# Patient Record
Sex: MALE | Race: OTHER | HISPANIC OR LATINO | URBAN - METROPOLITAN AREA
[De-identification: names, ages, dates, MRNs, and addresses within clinical notes are randomized per-mention and may not be internally consistent; named-entity substitution may affect disease eponyms.]

---

## 2017-03-20 ENCOUNTER — OUTPATIENT (OUTPATIENT)
Dept: OUTPATIENT SERVICES | Facility: HOSPITAL | Age: 27
LOS: 1 days | Discharge: HOME | End: 2017-03-20

## 2017-03-20 DIAGNOSIS — T84.84XA PAIN DUE TO INTERNAL ORTHOPEDIC PROSTHETIC DEVICES, IMPLANTS AND GRAFTS, INITIAL ENCOUNTER: ICD-10-CM

## 2017-03-20 DIAGNOSIS — Z96.7 PRESENCE OF OTHER BONE AND TENDON IMPLANTS: Chronic | ICD-10-CM

## 2017-03-20 DIAGNOSIS — Z98.890 OTHER SPECIFIED POSTPROCEDURAL STATES: Chronic | ICD-10-CM

## 2017-03-20 LAB
APPEARANCE UR: CLEAR — SIGNIFICANT CHANGE UP
BASOPHILS # BLD AUTO: 0.1 K/UL — SIGNIFICANT CHANGE UP (ref 0–0.2)
BASOPHILS NFR BLD AUTO: 0.8 % — SIGNIFICANT CHANGE UP (ref 0–2)
BILIRUB UR-MCNC: NEGATIVE — SIGNIFICANT CHANGE UP
COLOR SPEC: YELLOW — SIGNIFICANT CHANGE UP
DIFF PNL FLD: NEGATIVE — SIGNIFICANT CHANGE UP
EOSINOPHIL # BLD AUTO: 0.1 K/UL — SIGNIFICANT CHANGE UP (ref 0–0.5)
EOSINOPHIL NFR BLD AUTO: 1.8 % — SIGNIFICANT CHANGE UP (ref 0–6)
GLUCOSE UR QL: NEGATIVE MG/DL — SIGNIFICANT CHANGE UP
HCT VFR BLD CALC: 43 % — SIGNIFICANT CHANGE UP (ref 39–50)
HGB BLD-MCNC: 15.2 G/DL — SIGNIFICANT CHANGE UP (ref 13–17)
KETONES UR-MCNC: NEGATIVE — SIGNIFICANT CHANGE UP
LEUKOCYTE ESTERASE UR-ACNC: NEGATIVE — SIGNIFICANT CHANGE UP
LYMPHOCYTES # BLD AUTO: 2.5 K/UL — SIGNIFICANT CHANGE UP (ref 1–3.3)
LYMPHOCYTES # BLD AUTO: 34.7 % — SIGNIFICANT CHANGE UP (ref 13–44)
MCHC RBC-ENTMCNC: 35.2 GM/DL — SIGNIFICANT CHANGE UP (ref 32–36)
MCHC RBC-ENTMCNC: 36.3 PG — HIGH (ref 27–34)
MCV RBC AUTO: 103.2 FL — HIGH (ref 80–100)
MONOCYTES # BLD AUTO: 0.5 K/UL — SIGNIFICANT CHANGE UP (ref 0–0.9)
MONOCYTES NFR BLD AUTO: 7.4 % — SIGNIFICANT CHANGE UP (ref 2–14)
NEUTROPHILS # BLD AUTO: 3.9 K/UL — SIGNIFICANT CHANGE UP (ref 1.8–7.4)
NEUTROPHILS NFR BLD AUTO: 55.3 % — SIGNIFICANT CHANGE UP (ref 43–77)
NITRITE UR-MCNC: NEGATIVE — SIGNIFICANT CHANGE UP
PH UR: 5 — SIGNIFICANT CHANGE UP (ref 4.8–8)
PLATELET # BLD AUTO: 320 K/UL — SIGNIFICANT CHANGE UP (ref 150–400)
PROT UR-MCNC: NEGATIVE MG/DL — SIGNIFICANT CHANGE UP
RBC # BLD: 4.17 M/UL — LOW (ref 4.2–5.8)
RBC # FLD: 13.7 % — SIGNIFICANT CHANGE UP (ref 10.3–14.5)
SP GR SPEC: 1.02 — SIGNIFICANT CHANGE UP (ref 1.01–1.02)
UROBILINOGEN FLD QL: NEGATIVE MG/DL — SIGNIFICANT CHANGE UP
WBC # BLD: 7.1 K/UL — SIGNIFICANT CHANGE UP (ref 3.8–10.5)
WBC # FLD AUTO: 7.1 K/UL — SIGNIFICANT CHANGE UP (ref 3.8–10.5)

## 2017-03-20 NOTE — CHART NOTE - NSCHARTNOTEFT_GEN_A_CORE
Plan  1. Stop all NSAIDS, herbal supplements and vitamins for 7 days.  2. NPO at midnight.  3. Use EZ sponges as directed

## 2017-03-20 NOTE — ASU PATIENT PROFILE, ADULT - PSH
H/O right knee surgery  2008 for patella injury  S/P ORIF (open reduction internal fixation) fracture  2011 right patella fracture

## 2017-03-23 RX ORDER — ACETAMINOPHEN 500 MG
975 TABLET ORAL ONCE
Qty: 0 | Refills: 0 | Status: COMPLETED | OUTPATIENT
Start: 2017-03-24 | End: 2017-03-24

## 2017-03-23 RX ORDER — FAMOTIDINE 10 MG/ML
20 INJECTION INTRAVENOUS ONCE
Qty: 0 | Refills: 0 | Status: COMPLETED | OUTPATIENT
Start: 2017-03-24 | End: 2017-03-24

## 2017-03-23 RX ORDER — CELECOXIB 200 MG/1
200 CAPSULE ORAL ONCE
Qty: 0 | Refills: 0 | Status: COMPLETED | OUTPATIENT
Start: 2017-03-24 | End: 2017-03-24

## 2017-03-23 RX ORDER — OXYCODONE HYDROCHLORIDE 5 MG/1
10 TABLET ORAL ONCE
Qty: 0 | Refills: 0 | Status: DISCONTINUED | OUTPATIENT
Start: 2017-03-24 | End: 2017-03-24

## 2017-03-23 RX ORDER — SODIUM CHLORIDE 9 MG/ML
3 INJECTION INTRAMUSCULAR; INTRAVENOUS; SUBCUTANEOUS EVERY 8 HOURS
Qty: 0 | Refills: 0 | Status: DISCONTINUED | OUTPATIENT
Start: 2017-03-24 | End: 2017-04-08

## 2017-03-24 ENCOUNTER — OUTPATIENT (OUTPATIENT)
Dept: OUTPATIENT SERVICES | Facility: HOSPITAL | Age: 27
LOS: 1 days | Discharge: HOME | End: 2017-03-24
Payer: OTHER MISCELLANEOUS

## 2017-03-24 VITALS
HEART RATE: 84 BPM | TEMPERATURE: 98 F | DIASTOLIC BLOOD PRESSURE: 77 MMHG | OXYGEN SATURATION: 100 % | SYSTOLIC BLOOD PRESSURE: 122 MMHG | RESPIRATION RATE: 18 BRPM

## 2017-03-24 VITALS
WEIGHT: 145.95 LBS | TEMPERATURE: 98 F | SYSTOLIC BLOOD PRESSURE: 131 MMHG | HEART RATE: 91 BPM | RESPIRATION RATE: 16 BRPM | OXYGEN SATURATION: 100 % | HEIGHT: 61 IN | DIASTOLIC BLOOD PRESSURE: 67 MMHG

## 2017-03-24 DIAGNOSIS — Z98.890 OTHER SPECIFIED POSTPROCEDURAL STATES: Chronic | ICD-10-CM

## 2017-03-24 DIAGNOSIS — Z96.7 PRESENCE OF OTHER BONE AND TENDON IMPLANTS: Chronic | ICD-10-CM

## 2017-03-24 PROCEDURE — 73564 X-RAY EXAM KNEE 4 OR MORE: CPT | Mod: 26,RT

## 2017-03-24 RX ORDER — OXYCODONE HYDROCHLORIDE 5 MG/1
5 TABLET ORAL EVERY 4 HOURS
Qty: 0 | Refills: 0 | Status: DISCONTINUED | OUTPATIENT
Start: 2017-03-24 | End: 2017-03-24

## 2017-03-24 RX ORDER — FENTANYL CITRATE 50 UG/ML
50 INJECTION INTRAVENOUS
Qty: 0 | Refills: 0 | Status: DISCONTINUED | OUTPATIENT
Start: 2017-03-24 | End: 2017-03-24

## 2017-03-24 RX ORDER — ONDANSETRON 8 MG/1
4 TABLET, FILM COATED ORAL ONCE
Qty: 0 | Refills: 0 | Status: DISCONTINUED | OUTPATIENT
Start: 2017-03-24 | End: 2017-03-24

## 2017-03-24 RX ORDER — SODIUM CHLORIDE 9 MG/ML
1000 INJECTION INTRAMUSCULAR; INTRAVENOUS; SUBCUTANEOUS
Qty: 0 | Refills: 0 | Status: DISCONTINUED | OUTPATIENT
Start: 2017-03-24 | End: 2017-03-24

## 2017-03-24 RX ORDER — SODIUM CHLORIDE 9 MG/ML
1000 INJECTION INTRAMUSCULAR; INTRAVENOUS; SUBCUTANEOUS
Qty: 0 | Refills: 0 | Status: DISCONTINUED | OUTPATIENT
Start: 2017-03-24 | End: 2017-04-08

## 2017-03-24 RX ADMIN — FENTANYL CITRATE 50 MICROGRAM(S): 50 INJECTION INTRAVENOUS at 11:29

## 2017-03-24 RX ADMIN — SODIUM CHLORIDE 75 MILLILITER(S): 9 INJECTION INTRAMUSCULAR; INTRAVENOUS; SUBCUTANEOUS at 11:21

## 2017-03-24 RX ADMIN — CELECOXIB 200 MILLIGRAM(S): 200 CAPSULE ORAL at 09:06

## 2017-03-24 RX ADMIN — Medication 975 MILLIGRAM(S): at 09:06

## 2017-03-24 RX ADMIN — FAMOTIDINE 20 MILLIGRAM(S): 10 INJECTION INTRAVENOUS at 09:06

## 2017-03-24 RX ADMIN — OXYCODONE HYDROCHLORIDE 10 MILLIGRAM(S): 5 TABLET ORAL at 09:06

## 2017-03-24 RX ADMIN — OXYCODONE HYDROCHLORIDE 5 MILLIGRAM(S): 5 TABLET ORAL at 11:32

## 2017-03-24 NOTE — ASU DISCHARGE PLAN (ADULT/PEDIATRIC). - NOTIFY
Bleeding that does not stop/Fever greater than 101/Numbness, color, or temperature change to extremity/Swelling that continues
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
## 2017-03-29 DIAGNOSIS — T84.84XA PAIN DUE TO INTERNAL ORTHOPEDIC PROSTHETIC DEVICES, IMPLANTS AND GRAFTS, INITIAL ENCOUNTER: ICD-10-CM

## 2017-06-25 ENCOUNTER — EMERGENCY (EMERGENCY)
Facility: HOSPITAL | Age: 27
LOS: 0 days | Discharge: HOME | End: 2017-06-25
Attending: EMERGENCY MEDICINE | Admitting: EMERGENCY MEDICINE
Payer: COMMERCIAL

## 2017-06-25 VITALS
SYSTOLIC BLOOD PRESSURE: 120 MMHG | OXYGEN SATURATION: 100 % | HEART RATE: 74 BPM | DIASTOLIC BLOOD PRESSURE: 86 MMHG | TEMPERATURE: 97 F | HEIGHT: 65 IN | RESPIRATION RATE: 18 BRPM | WEIGHT: 139.99 LBS

## 2017-06-25 DIAGNOSIS — Z96.7 PRESENCE OF OTHER BONE AND TENDON IMPLANTS: Chronic | ICD-10-CM

## 2017-06-25 DIAGNOSIS — S82.091A OTHER FRACTURE OF RIGHT PATELLA, INITIAL ENCOUNTER FOR CLOSED FRACTURE: ICD-10-CM

## 2017-06-25 DIAGNOSIS — Z98.890 OTHER SPECIFIED POSTPROCEDURAL STATES: Chronic | ICD-10-CM
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
## 2017-06-25 PROCEDURE — 73562 X-RAY EXAM OF KNEE 3: CPT | Mod: 26,RT

## 2017-06-25 PROCEDURE — 99283 EMERGENCY DEPT VISIT LOW MDM: CPT

## 2017-06-25 RX ORDER — OXYCODONE AND ACETAMINOPHEN 5; 325 MG/1; MG/1
1 TABLET ORAL
Qty: 12 | Refills: 0
Start: 2017-06-25 | End: 2017-06-28

## 2017-06-25 NOTE — ED STATDOCS - ATTENDING CONTRIBUTION TO CARE
I, Shaq Mcneil,  performed the initial face to face bedside interview with this patient regarding history of present illness, review of symptoms and relevant past medical, social and family history.  I completed an independent physical examination.  I was the initial provider who evaluated this patient. I have signed out the follow up of any pending tests (i.e. labs, radiological studies) to the ACP.  I have communicated the patient’s plan of care and disposition with the ACP.  The history, relevant review of systems, past medical and surgical history, medical decision making, and physical examination was documented by the scribe in my presence and I attest to the accuracy of the documentation.

## 2017-06-25 NOTE — ED STATDOCS - CARE PLAN
Principal Discharge DX:	Closed displaced fracture of right patella with malunion, unspecified fracture morphology, subsequent encounter

## 2017-06-25 NOTE — ED ADULT NURSE NOTE - CHIEF COMPLAINT QUOTE
pt [facility] no blood thinners did not hit head. no LOC. hurt right knee. able to ambulate with assist.

## 2017-06-25 NOTE — ED STATDOCS - PROGRESS NOTE DETAILS
27 yo male with a PMH of patellar fx with surgery in 2011 and removal of harware 3 months ago presents with right knee pain s/p fall. Pt able to walk on it but pain with flexion of the knee. Possible deformity noted with palpation of the patella. -Andrea Slaughter PA-C Spoke with ortho. Will come down to see him. -Andrea Slaughter PA-C Ortho called back and said that they wouldn't be able to come down, to place a knee immobilizer on the knee and weight bearing with crutches. -Pastor Slaughter PA-C

## 2017-06-25 NOTE — ED STATDOCS - PRINCIPAL DIAGNOSIS
Closed displaced fracture of right patella with malunion, unspecified fracture morphology, subsequent encounter

## 2017-06-25 NOTE — ED ADULT TRIAGE NOTE - CHIEF COMPLAINT QUOTE
pt [facility] no blood thinners did not hit head. no LOC. hurt right knee. able to ambulate with assist.

## 2017-06-25 NOTE — ED STATDOCS - OBJECTIVE STATEMENT
27 y/o M presents to the ED c/o right knee pain. Pt slipped and fell today at home. He states he has had 3 surgeries on his right knee, current orthopedist is Dr. Ahuja. Currently pt has no other complaints.

## 2017-10-17 ENCOUNTER — TRANSCRIPTION ENCOUNTER (OUTPATIENT)
Age: 27
End: 2017-10-17

## 2017-10-18 ENCOUNTER — APPOINTMENT (OUTPATIENT)
Dept: ORTHOPEDIC SURGERY | Facility: CLINIC | Age: 27
End: 2017-10-18
Payer: COMMERCIAL

## 2017-10-18 VITALS
SYSTOLIC BLOOD PRESSURE: 140 MMHG | HEIGHT: 61 IN | HEART RATE: 122 BPM | WEIGHT: 143 LBS | BODY MASS INDEX: 27 KG/M2 | DIASTOLIC BLOOD PRESSURE: 97 MMHG

## 2017-10-18 PROBLEM — Z00.00 ENCOUNTER FOR PREVENTIVE HEALTH EXAMINATION: Status: ACTIVE | Noted: 2017-10-18

## 2017-10-18 PROCEDURE — 99204 OFFICE O/P NEW MOD 45 MIN: CPT

## 2017-11-01 ENCOUNTER — TRANSCRIPTION ENCOUNTER (OUTPATIENT)
Age: 27
End: 2017-11-01

## 2017-12-18 ENCOUNTER — APPOINTMENT (OUTPATIENT)
Dept: ORTHOPEDIC SURGERY | Facility: CLINIC | Age: 27
End: 2017-12-18
Payer: COMMERCIAL

## 2017-12-18 VITALS
HEART RATE: 102 BPM | SYSTOLIC BLOOD PRESSURE: 150 MMHG | BODY MASS INDEX: 27 KG/M2 | WEIGHT: 143 LBS | HEIGHT: 61 IN | DIASTOLIC BLOOD PRESSURE: 96 MMHG

## 2017-12-18 DIAGNOSIS — Z78.9 OTHER SPECIFIED HEALTH STATUS: ICD-10-CM

## 2017-12-18 PROCEDURE — 73560 X-RAY EXAM OF KNEE 1 OR 2: CPT | Mod: RT

## 2017-12-18 PROCEDURE — 99214 OFFICE O/P EST MOD 30 MIN: CPT

## 2017-12-18 RX ORDER — CHROMIUM 200 MCG
TABLET ORAL
Refills: 0 | Status: ACTIVE | COMMUNITY

## 2018-03-27 ENCOUNTER — APPOINTMENT (OUTPATIENT)
Dept: ORTHOPEDIC SURGERY | Facility: CLINIC | Age: 28
End: 2018-03-27

## 2018-04-17 ENCOUNTER — APPOINTMENT (OUTPATIENT)
Dept: ORTHOPEDIC SURGERY | Facility: CLINIC | Age: 28
End: 2018-04-17
Payer: COMMERCIAL

## 2018-04-17 VITALS
WEIGHT: 143 LBS | DIASTOLIC BLOOD PRESSURE: 87 MMHG | HEIGHT: 61 IN | HEART RATE: 100 BPM | SYSTOLIC BLOOD PRESSURE: 151 MMHG | BODY MASS INDEX: 27 KG/M2

## 2018-04-17 VITALS
DIASTOLIC BLOOD PRESSURE: 97 MMHG | SYSTOLIC BLOOD PRESSURE: 139 MMHG | HEIGHT: 61 IN | BODY MASS INDEX: 27 KG/M2 | WEIGHT: 143 LBS | HEART RATE: 101 BPM

## 2018-04-17 PROCEDURE — 99214 OFFICE O/P EST MOD 30 MIN: CPT

## 2018-04-17 PROCEDURE — 73560 X-RAY EXAM OF KNEE 1 OR 2: CPT | Mod: RT

## 2019-08-30 PROBLEM — F41.9 ANXIETY DISORDER, UNSPECIFIED: Chronic | Status: ACTIVE | Noted: 2017-03-20

## 2019-08-30 PROBLEM — S82.009A UNSPECIFIED FRACTURE OF UNSPECIFIED PATELLA, INITIAL ENCOUNTER FOR CLOSED FRACTURE: Chronic | Status: ACTIVE | Noted: 2017-03-20

## 2019-08-30 PROBLEM — M25.561 PAIN IN RIGHT KNEE: Chronic | Status: ACTIVE | Noted: 2017-03-20

## 2019-09-14 ENCOUNTER — TRANSCRIPTION ENCOUNTER (OUTPATIENT)
Age: 29
End: 2019-09-14

## 2019-09-17 ENCOUNTER — APPOINTMENT (OUTPATIENT)
Dept: ORTHOPEDIC SURGERY | Facility: CLINIC | Age: 29
End: 2019-09-17
Payer: COMMERCIAL

## 2019-09-17 VITALS
HEART RATE: 96 BPM | BODY MASS INDEX: 27.38 KG/M2 | TEMPERATURE: 99.1 F | HEIGHT: 61 IN | WEIGHT: 145 LBS | SYSTOLIC BLOOD PRESSURE: 156 MMHG | DIASTOLIC BLOOD PRESSURE: 103 MMHG

## 2019-09-17 PROCEDURE — 73560 X-RAY EXAM OF KNEE 1 OR 2: CPT | Mod: RT

## 2019-09-17 PROCEDURE — 99214 OFFICE O/P EST MOD 30 MIN: CPT

## 2019-09-17 NOTE — PHYSICAL EXAM
[de-identified] :  Films from today of the right knee shows a transverse patella fracture stabilized by tension band fixation. There is evidence of fracture healing with likely increased callus at the fracture site. \par  [de-identified] : General: Well appearing, no acute distress, A&O\par Neurologic: A&Ox3, No focal deficits\par Head: NCAT without abrasions, lacerations, or ecchymosis to head, face, or scalp\par Eyes: No scleral icterus, no gross abnormalities\par Respiratory: Equal chest wall expansion bilaterally, no accessory muscle use\par Lymphatic: No lymphadenopathy palpated\par Skin: Warm and dry\par Psychiatric: Normal mood and affect\par \par right knee exam\par \par Skin: well healed anterior surgical scars\par Inspection: No obvious malalignment, no masses, no swelling, no effusion\par Pulses: 2+ DP/PT pulses\par ROM: RIGHT 0-120 degrees of flexion. No pain with deep knee flexion. LEFT 0-140 degrees of flexion. No pain with deep knee flexion. \par Tenderness: No MJLT. No LJLT. No pain over the patella facets. No pain to the quadriceps mechanism. +TTP over wire\par Stability: Stable to varus, valgus, lachman testing. Negative anterior/posterior drawer.\par Strength: 5/5 Q/H/TA/GS/EHL, no atrophy\par Neuro: In tact to light touch throughout,\par Additional tests: Negative McMurrays test, + patellar grind test. \par \par

## 2019-09-17 NOTE — DISCUSSION/SUMMARY
[de-identified] : 28-year-old male with right patellar delayed union. We had a prolonged discussion today about the options available to him currently. These include continued observation with conservative management or revision surgery. We discussed that there is no guarantee that the patella will heal given the multitude of surgery he has had performed. Currently his knee is functional and has a great range of motion. I think there has been healing since the last visit. We discussed that there is a significant risk of making the knee worse rather than better with additional surgery. I would recommend topical anti-inflammatories for his pain but if it does not relieve the pain, we can discuss implant removal with either myself or Dr. Alexander. He may return to all activities without restriction & follow up PRN. The patient was given the opportunity to ask questions and all questions were answered to their satisfaction.\par \par Filiberto Obrien MD\par Orthopaedic Trauma Surgeon\par [facility]\par [facility] [place]

## 2019-09-17 NOTE — HISTORY OF PRESENT ILLNESS
[3] : a current pain level of 3/10 [de-identified] : 27 yo M presents for follow up on R open patella fx s/p ORIF 6 surgeries due to nonhealing patella fx. He reports overall he is doing well. He has been exercising the knee with PT without difficulties. He denies any pain with ambulation or with knee ROM. He has an area on the lateral knee that is TTP. He had been following with endocrinologist and was found to be Vit D deficient & has been taking Vitamin D. Modifying factors - not exacerbated by bending, not exacerbated by lifting and not exacerbated by weight bearing.  [Bending] : worsened by bending [Lifting] : worsened by lifting [Recumbency] : relieved by recumbency [Rest] : relieved by rest

## 2019-10-17 ENCOUNTER — OTHER (OUTPATIENT)
Age: 29
End: 2019-10-17

## 2019-10-17 RX ORDER — DICLOFENAC SODIUM 10 MG/G
1 GEL TOPICAL DAILY
Qty: 1 | Refills: 3 | Status: DISCONTINUED | COMMUNITY
Start: 2019-09-17 | End: 2019-10-17

## 2020-05-19 ENCOUNTER — APPOINTMENT (OUTPATIENT)
Dept: ORTHOPEDIC SURGERY | Facility: CLINIC | Age: 30
End: 2020-05-19

## 2020-07-06 ENCOUNTER — APPOINTMENT (OUTPATIENT)
Dept: ORTHOPEDIC SURGERY | Facility: CLINIC | Age: 30
End: 2020-07-06
Payer: COMMERCIAL

## 2020-07-06 VITALS
SYSTOLIC BLOOD PRESSURE: 142 MMHG | BODY MASS INDEX: 27.38 KG/M2 | HEIGHT: 61 IN | DIASTOLIC BLOOD PRESSURE: 94 MMHG | WEIGHT: 145 LBS | HEART RATE: 99 BPM

## 2020-07-06 VITALS — TEMPERATURE: 97.9 F

## 2020-07-06 PROCEDURE — 73560 X-RAY EXAM OF KNEE 1 OR 2: CPT | Mod: RT

## 2020-07-06 PROCEDURE — 99214 OFFICE O/P EST MOD 30 MIN: CPT

## 2020-07-06 NOTE — DISCUSSION/SUMMARY
[de-identified] : 29-year-old male with right patellar delayed union with hardware irritation.\par \par \par  Currently his knee is functional and has a great range of motion. I think there has been healing since the last visit. Patient now has new pain since December where hardware is prominent and causing underlying pain and irritation preventing him from doing things he would like to do physically. We discussed that there is a significant risk of making the knee worse rather than better with additional surgery. Patient tried to see Dr. Alexander to schedule hardware removal but with his new job and changed insurance Dr. Alexander is no longer in his network. He would like to have the hardware removed as he has tried antiinflammatories and activity modification and feels the prominent hardware is prohibiting his further progression. The patient was given the information to call my surgical scheduler in the next couple of days and work out a time that works for him for hardware removal. The patient was given the opportunity to ask questions and all questions were answered to their satisfaction.\par \par \par \par Filiberto Obrien MD\par Orthopaedic Trauma Surgeon\par [facility]\par [facility] [place]

## 2020-07-06 NOTE — HISTORY OF PRESENT ILLNESS
[de-identified] : 30 yo M presents for follow up on R open patella fx s/p ORIF 6 surgeries due to nonhealing patella fx. He reports overall he is doing well, but has had pain since december over the superolateral portion of his patella where he has hardware irritation and a small skin irritation. He denies any pain with ambulation or with knee ROM. He has an area on the lateral knee that is TTP.

## 2020-07-06 NOTE — PHYSICAL EXAM
[de-identified] : General: Well appearing, no acute distress, A&O\par Neurologic: A&Ox3, No focal deficits\par Head: NCAT without abrasions, lacerations, or ecchymosis to head, face, or scalp\par Eyes: No scleral icterus, no gross abnormalities\par Respiratory: Equal chest wall expansion bilaterally, no accessory muscle use\par Lymphatic: No lymphadenopathy palpated\par Skin: Warm and dry\par Psychiatric: Normal mood and affect\par \par right knee exam\par \par Skin: well healed anterior surgical scars, small anterolateral pustule where hardware irritation is from the tension band. No drainage. Very Tender to palpation.\par Inspection: No obvious malalignment, no masses, no swelling, no effusion\par Pulses: 2+ DP/PT pulses\par ROM: RIGHT 0-120 degrees of flexion. Mild quadriceps atrophy compared to contralateral side. No pain with deep knee flexion. LEFT 0-140 degrees of flexion. No pain with deep knee flexion. \par Tenderness: No MJLT. No LJLT. No pain over the patella facets. No pain to the quadriceps mechanism. +TTP over wire\par Stability: Stable to varus, valgus, lachman testing. Negative anterior/posterior drawer.\par Strength: 5/5 Q/H/TA/GS/EHL, no atrophy\par Neuro: In tact to light touch throughout,\par Additional tests: Negative McMurrays test, + patellar grind test. \par \par  [de-identified] :  Films from today of the right knee shows a transverse patella fracture stabilized by tension band fixation. There is evidence of healing with increased bridging callus at the fracture site. \par

## 2020-07-10 ENCOUNTER — OUTPATIENT (OUTPATIENT)
Dept: OUTPATIENT SERVICES | Facility: HOSPITAL | Age: 30
LOS: 1 days | End: 2020-07-10
Payer: COMMERCIAL

## 2020-07-10 DIAGNOSIS — Z98.890 OTHER SPECIFIED POSTPROCEDURAL STATES: Chronic | ICD-10-CM

## 2020-07-10 DIAGNOSIS — Z96.7 PRESENCE OF OTHER BONE AND TENDON IMPLANTS: Chronic | ICD-10-CM

## 2020-07-10 DIAGNOSIS — Z01.818 ENCOUNTER FOR OTHER PREPROCEDURAL EXAMINATION: ICD-10-CM

## 2020-07-10 LAB
ANION GAP SERPL CALC-SCNC: 13 MMOL/L — SIGNIFICANT CHANGE UP (ref 5–17)
APTT BLD: 35.1 SEC — SIGNIFICANT CHANGE UP (ref 27.5–35.5)
BASOPHILS # BLD AUTO: 0.04 K/UL — SIGNIFICANT CHANGE UP (ref 0–0.2)
BASOPHILS NFR BLD AUTO: 0.5 % — SIGNIFICANT CHANGE UP (ref 0–2)
BUN SERPL-MCNC: 13 MG/DL — SIGNIFICANT CHANGE UP (ref 8–20)
CALCIUM SERPL-MCNC: 9.2 MG/DL — SIGNIFICANT CHANGE UP (ref 8.6–10.2)
CHLORIDE SERPL-SCNC: 101 MMOL/L — SIGNIFICANT CHANGE UP (ref 98–107)
CO2 SERPL-SCNC: 25 MMOL/L — SIGNIFICANT CHANGE UP (ref 22–29)
CREAT SERPL-MCNC: 0.69 MG/DL — SIGNIFICANT CHANGE UP (ref 0.5–1.3)
EOSINOPHIL # BLD AUTO: 0.05 K/UL — SIGNIFICANT CHANGE UP (ref 0–0.5)
EOSINOPHIL NFR BLD AUTO: 0.6 % — SIGNIFICANT CHANGE UP (ref 0–6)
GLUCOSE SERPL-MCNC: 90 MG/DL — SIGNIFICANT CHANGE UP (ref 70–99)
HCT VFR BLD CALC: 43.2 % — SIGNIFICANT CHANGE UP (ref 39–50)
HGB BLD-MCNC: 15.3 G/DL — SIGNIFICANT CHANGE UP (ref 13–17)
IMM GRANULOCYTES NFR BLD AUTO: 0.2 % — SIGNIFICANT CHANGE UP (ref 0–1.5)
INR BLD: 1.1 RATIO — SIGNIFICANT CHANGE UP (ref 0.88–1.16)
LYMPHOCYTES # BLD AUTO: 3.33 K/UL — HIGH (ref 1–3.3)
LYMPHOCYTES # BLD AUTO: 39.1 % — SIGNIFICANT CHANGE UP (ref 13–44)
MCHC RBC-ENTMCNC: 35.4 GM/DL — SIGNIFICANT CHANGE UP (ref 32–36)
MCHC RBC-ENTMCNC: 36.3 PG — HIGH (ref 27–34)
MCV RBC AUTO: 102.4 FL — HIGH (ref 80–100)
MONOCYTES # BLD AUTO: 0.48 K/UL — SIGNIFICANT CHANGE UP (ref 0–0.9)
MONOCYTES NFR BLD AUTO: 5.6 % — SIGNIFICANT CHANGE UP (ref 2–14)
NEUTROPHILS # BLD AUTO: 4.6 K/UL — SIGNIFICANT CHANGE UP (ref 1.8–7.4)
NEUTROPHILS NFR BLD AUTO: 54 % — SIGNIFICANT CHANGE UP (ref 43–77)
PLATELET # BLD AUTO: 306 K/UL — SIGNIFICANT CHANGE UP (ref 150–400)
POTASSIUM SERPL-MCNC: 4 MMOL/L — SIGNIFICANT CHANGE UP (ref 3.5–5.3)
POTASSIUM SERPL-SCNC: 4 MMOL/L — SIGNIFICANT CHANGE UP (ref 3.5–5.3)
PROTHROM AB SERPL-ACNC: 12.7 SEC — SIGNIFICANT CHANGE UP (ref 10.6–13.6)
RBC # BLD: 4.22 M/UL — SIGNIFICANT CHANGE UP (ref 4.2–5.8)
RBC # FLD: 13.7 % — SIGNIFICANT CHANGE UP (ref 10.3–14.5)
SODIUM SERPL-SCNC: 139 MMOL/L — SIGNIFICANT CHANGE UP (ref 135–145)
WBC # BLD: 8.52 K/UL — SIGNIFICANT CHANGE UP (ref 3.8–10.5)
WBC # FLD AUTO: 8.52 K/UL — SIGNIFICANT CHANGE UP (ref 3.8–10.5)

## 2020-07-10 PROCEDURE — 85610 PROTHROMBIN TIME: CPT

## 2020-07-10 PROCEDURE — 36415 COLL VENOUS BLD VENIPUNCTURE: CPT

## 2020-07-10 PROCEDURE — 85730 THROMBOPLASTIN TIME PARTIAL: CPT

## 2020-07-10 PROCEDURE — 85027 COMPLETE CBC AUTOMATED: CPT

## 2020-07-10 PROCEDURE — 80048 BASIC METABOLIC PNL TOTAL CA: CPT

## 2020-07-10 PROCEDURE — G0463: CPT

## 2020-07-11 ENCOUNTER — APPOINTMENT (OUTPATIENT)
Dept: DISASTER EMERGENCY | Facility: CLINIC | Age: 30
End: 2020-07-11

## 2020-07-11 LAB — SARS-COV-2 N GENE NPH QL NAA+PROBE: NOT DETECTED

## 2020-07-16 ENCOUNTER — APPOINTMENT (OUTPATIENT)
Dept: ORTHOPEDIC SURGERY | Facility: AMBULATORY SURGERY CENTER | Age: 30
End: 2020-07-16
Payer: COMMERCIAL

## 2020-07-16 PROCEDURE — 27524 TREAT KNEECAP FRACTURE: CPT | Mod: RT

## 2020-07-22 ENCOUNTER — APPOINTMENT (OUTPATIENT)
Dept: ORTHOPEDIC SURGERY | Facility: CLINIC | Age: 30
End: 2020-07-22
Payer: COMMERCIAL

## 2020-07-22 PROCEDURE — 99024 POSTOP FOLLOW-UP VISIT: CPT

## 2020-07-22 PROCEDURE — 73560 X-RAY EXAM OF KNEE 1 OR 2: CPT | Mod: RT

## 2020-07-22 NOTE — HISTORY OF PRESENT ILLNESS
[Clean/Dry/Intact] : clean, dry and intact [Healed] : healed [Erythema] : not erythematous [Discharge] : absent of discharge [Swelling] : swollen [Neuro Intact] : an unremarkable neurological exam [Vascular Intact] : ~T peripheral vascular exam normal [Slow Progress] : is progressing slowly [No Sign of Infection] : is showing no signs of infection [Adequate Pain Control] : has adequate pain control [de-identified] : s/p repair of nonunion, right patella. Removal of implants, right patella. 7/6/2020 [de-identified] : The patient is a 29-year-old gentleman who presents today for postoperative follow-up s/p repair of nonunion, right patella. Removal of implants, right patella. 7/6/2020.  He is a history of multiple previous surgeries on the patella as well as patella nonunion.  He was doing well initially after surgery but states he is having difficulty extending the knee at the present time.  No pain at rest but positive pain with activity [de-identified] : Physical Exam:\par General: Well appearing, no acute distress, A&O\par Neurologic: A&Ox3, No focal deficits\par Head: NCAT without abrasions, lacerations, or ecchymosis to head, face, or scalp\par Respiratory: Equal chest wall expansion bilaterally, no accessory muscle use\par Lymphatic: No lymphadenopathy palpated\par Skin: Warm and dry\par Psychiatric: Normal mood and affect\par \par Straight leg raise limited secondary to pain.  Difficult to ascertain if limited quadriceps function is due to pain or patient participation. [de-identified] : Plain films of the right knee were obtained today.  They show no acute fracture, subluxation or dislocation. [de-identified] : There is difficult to fully tell if he is having weakness secondary to pain or if there could be some underlying pathology.  If he does not improve in the next week or 2, we will likely obtain an MRI of the knee to evaluate for any signs of patella tendon disruption or other extensor mechanism problem.  The patient was given the opportunity to ask questions and all questions were answered to their satisfaction.  \par \par Filiberto Obrien MD\par Orthopaedic Trauma Surgeon\par [facility] [place]\par Director Orthopaedic Trauma, [facility]\par \par \par \par

## 2021-04-15 DIAGNOSIS — Z01.818 ENCOUNTER FOR OTHER PREPROCEDURAL EXAMINATION: ICD-10-CM

## 2021-04-27 PROBLEM — Z87.81 HISTORY OF FRACTURE OF PATELLA: Status: RESOLVED | Noted: 2021-04-27 | Resolved: 2021-04-27

## 2021-04-27 PROBLEM — R79.89 ELEVATED LIVER FUNCTION TESTS: Status: RESOLVED | Noted: 2021-04-27 | Resolved: 2021-04-27

## 2021-05-05 ENCOUNTER — OUTPATIENT (OUTPATIENT)
Dept: OUTPATIENT SERVICES | Facility: HOSPITAL | Age: 31
LOS: 1 days | Discharge: HOME | End: 2021-05-05

## 2021-05-05 DIAGNOSIS — Z96.7 PRESENCE OF OTHER BONE AND TENDON IMPLANTS: Chronic | ICD-10-CM

## 2021-05-05 DIAGNOSIS — Z98.890 OTHER SPECIFIED POSTPROCEDURAL STATES: Chronic | ICD-10-CM

## 2021-05-05 DIAGNOSIS — D75.89 OTHER SPECIFIED DISEASES OF BLOOD AND BLOOD-FORMING ORGANS: ICD-10-CM

## 2021-05-06 ENCOUNTER — RESULT REVIEW (OUTPATIENT)
Age: 31
End: 2021-05-06

## 2021-05-06 ENCOUNTER — NON-APPOINTMENT (OUTPATIENT)
Age: 31
End: 2021-05-06

## 2021-05-06 ENCOUNTER — OUTPATIENT (OUTPATIENT)
Dept: OUTPATIENT SERVICES | Facility: HOSPITAL | Age: 31
LOS: 1 days | End: 2021-05-06
Payer: COMMERCIAL

## 2021-05-06 ENCOUNTER — APPOINTMENT (OUTPATIENT)
Dept: HEMATOLOGY ONCOLOGY | Facility: CLINIC | Age: 31
End: 2021-05-06
Payer: COMMERCIAL

## 2021-05-06 VITALS
HEART RATE: 83 BPM | WEIGHT: 144 LBS | BODY MASS INDEX: 27.21 KG/M2 | DIASTOLIC BLOOD PRESSURE: 82 MMHG | TEMPERATURE: 98.7 F | SYSTOLIC BLOOD PRESSURE: 145 MMHG

## 2021-05-06 DIAGNOSIS — Z86.39 PERSONAL HISTORY OF OTHER ENDOCRINE, NUTRITIONAL AND METABOLIC DISEASE: ICD-10-CM

## 2021-05-06 DIAGNOSIS — Z98.890 OTHER SPECIFIED POSTPROCEDURAL STATES: Chronic | ICD-10-CM

## 2021-05-06 DIAGNOSIS — R79.89 OTHER SPECIFIED ABNORMAL FINDINGS OF BLOOD CHEMISTRY: ICD-10-CM

## 2021-05-06 DIAGNOSIS — Z80.3 FAMILY HISTORY OF MALIGNANT NEOPLASM OF BREAST: ICD-10-CM

## 2021-05-06 DIAGNOSIS — Z96.7 PRESENCE OF OTHER BONE AND TENDON IMPLANTS: Chronic | ICD-10-CM

## 2021-05-06 DIAGNOSIS — Z87.81 PERSONAL HISTORY OF (HEALED) TRAUMATIC FRACTURE: ICD-10-CM

## 2021-05-06 LAB
BASOPHILS # BLD AUTO: 0.04 K/UL — SIGNIFICANT CHANGE UP (ref 0–0.2)
BASOPHILS NFR BLD AUTO: 0.6 % — SIGNIFICANT CHANGE UP (ref 0–2)
EOSINOPHIL # BLD AUTO: 0.09 K/UL — SIGNIFICANT CHANGE UP (ref 0–0.5)
EOSINOPHIL NFR BLD AUTO: 1.3 % — SIGNIFICANT CHANGE UP (ref 0–6)
FERRITIN SERPL-MCNC: 841 NG/ML — HIGH (ref 30–400)
HCT VFR BLD CALC: 38.8 % — LOW (ref 39–50)
HGB BLD-MCNC: 14.2 G/DL — SIGNIFICANT CHANGE UP (ref 13–17)
IMM GRANULOCYTES NFR BLD AUTO: 0.3 % — SIGNIFICANT CHANGE UP (ref 0–1.5)
IRON SATN MFR SERPL: 29 % — SIGNIFICANT CHANGE UP (ref 16–55)
IRON SATN MFR SERPL: 89 UG/DL — SIGNIFICANT CHANGE UP (ref 45–165)
LYMPHOCYTES # BLD AUTO: 3.34 K/UL — HIGH (ref 1–3.3)
LYMPHOCYTES # BLD AUTO: 49.4 % — HIGH (ref 13–44)
MCHC RBC-ENTMCNC: 36.6 GM/DL — HIGH (ref 32–36)
MCHC RBC-ENTMCNC: 37.2 PG — HIGH (ref 27–34)
MCV RBC AUTO: 101.6 FL — HIGH (ref 80–100)
MONOCYTES # BLD AUTO: 0.38 K/UL — SIGNIFICANT CHANGE UP (ref 0–0.9)
MONOCYTES NFR BLD AUTO: 5.6 % — SIGNIFICANT CHANGE UP (ref 2–14)
NEUTROPHILS # BLD AUTO: 2.89 K/UL — SIGNIFICANT CHANGE UP (ref 1.8–7.4)
NEUTROPHILS NFR BLD AUTO: 42.8 % — LOW (ref 43–77)
NRBC # BLD: 0 /100 WBCS — SIGNIFICANT CHANGE UP (ref 0–0)
PLATELET # BLD AUTO: 274 K/UL — SIGNIFICANT CHANGE UP (ref 150–400)
RBC # BLD: 3.82 M/UL — LOW (ref 4.2–5.8)
RBC # BLD: 4 M/UL — LOW (ref 4.2–5.8)
RBC # FLD: 13.9 % — SIGNIFICANT CHANGE UP (ref 10.3–14.5)
RETICS #: 138 K/UL — HIGH (ref 25–125)
RETICS/RBC NFR: 3.5 % — HIGH (ref 0.5–2.5)
TIBC SERPL-MCNC: 311 UG/DL — SIGNIFICANT CHANGE UP (ref 220–430)
UIBC SERPL-MCNC: 221 UG/DL — SIGNIFICANT CHANGE UP (ref 110–370)
WBC # BLD: 6.76 K/UL — SIGNIFICANT CHANGE UP (ref 3.8–10.5)
WBC # FLD AUTO: 6.76 K/UL — SIGNIFICANT CHANGE UP (ref 3.8–10.5)

## 2021-05-06 PROCEDURE — 81256 HFE GENE: CPT

## 2021-05-06 PROCEDURE — 99203 OFFICE O/P NEW LOW 30 MIN: CPT

## 2021-05-06 RX ORDER — DICLOFENAC SODIUM 10 MG/G
1 GEL TOPICAL DAILY
Qty: 1 | Refills: 0 | Status: DISCONTINUED | COMMUNITY
Start: 2019-10-17 | End: 2021-05-06

## 2021-05-06 RX ORDER — OXYCODONE 5 MG/1
5 TABLET ORAL EVERY 6 HOURS
Qty: 20 | Refills: 0 | Status: DISCONTINUED | COMMUNITY
Start: 2020-07-16 | End: 2021-05-06

## 2021-05-10 PROBLEM — Z80.3 FAMILY HISTORY OF MALIGNANT NEOPLASM OF BREAST: Status: ACTIVE | Noted: 2021-05-10

## 2021-05-10 RX ORDER — PSYLLIUM HUSK 0.4 G
CAPSULE ORAL
Refills: 0 | Status: ACTIVE | COMMUNITY

## 2021-05-10 RX ORDER — ESCITALOPRAM OXALATE 10 MG/1
10 TABLET ORAL
Refills: 0 | Status: ACTIVE | COMMUNITY

## 2021-05-10 NOTE — ASSESSMENT
[FreeTextEntry1] : Patient is a 30 y.o. with a chronic macrocytosis and also noted to have an elevated Ferritin level. \par 1. Macrocytosis - No apparent nutritional deficiencies, Euthyroid, and he is not a drinker.  Also cannot be attributed to any medication.   Will send off retic count  - can possibly be related to a compensated hemolysis. May also just be normal variant of no clinical concern. \par 2. Elevated Ferritin - Patient has had mild ALT elevations and Ferritin level >600 in 2019.  Will repeat today and also send HFE testing as well as zinc and copper levels.  May also be reactive give inflammation from chronic knee issues.  \par Patient was asked to return in ~ 1 month to review lab results. \par He was seen along with Dr. Goff.  She agrees with the above plan. \par \par \par Marcelle Wilkins\par Teresa Truong (Significant other)\par Dylan Mei (Father)

## 2021-05-10 NOTE — HISTORY OF PRESENT ILLNESS
[de-identified] : DONNA KAPLAN is a 30 y.o. with a PMH significant for multiple surgeries for a right knee patella fracture who is being referred to our office for macrocytosis and for a hx of an elevated Ferritin level. \par 4/3/21 - Routine labs for PCP visit - WBC: 6.18,  Hgb: 13.9   Hct:40.2   MCV: 106.1  Plts:  292  ESR: 2,  Ferritin: X    TSAT: 22%, B12: 511,  Folate: 8.8\par On review of old labs he had a Ferritin of 631 9/16/19, TSAT 23%, and a mildly elevated ALT of 59. Labs 1 month prior showed positive Hep A  Margareth (total, IgM neg), MCV at this time was 102.8. \par Patient reports after being told this he obtained his old medical records and trended his MCV over last 10 years.  He reports the lowest value was 98, highest was ~ 106.  \par He is not on, has not been on any long term meds.  He does not drink.  \par \par His major medical issue is multiple surgeries for a right knee patella fracture.  He has had at least 6 surgeries total starting 2004. \par Fractured 1st in 8th grade.  Apparently had ORIF, then had implants removed and had a refracture.  This was fixed but there was a concern for nonunion from another surgery and so needed repeat fixation. At one point there was delayed healing with a small gap requiring a bone growth stimulator.\par Then 6/25/17  he fell in the kitchen - poor healing.\par 7/6/20 - Repair of non-union and removal of implants - Filiberto Obrien.\par \par He does not recall ever being told that he had an elevated Ferritin level.  No family hx of this either. \par \par He reports chronic fatigue vs. lack of motivation.  He has trouble focusing and with his memory

## 2021-05-10 NOTE — REVIEW OF SYSTEMS
[Patient Intake Form Reviewed] : Patient intake form was reviewed [Negative] : Allergic/Immunologic [Fatigue] : fatigue

## 2021-05-11 LAB
COPPER SERPL-MCNC: 95 UG/DL — SIGNIFICANT CHANGE UP (ref 63–121)
ZINC SERPL-MCNC: 105 UG/DL — SIGNIFICANT CHANGE UP (ref 44–115)

## 2021-05-28 ENCOUNTER — APPOINTMENT (OUTPATIENT)
Dept: HEMATOLOGY ONCOLOGY | Facility: CLINIC | Age: 31
End: 2021-05-28
Payer: COMMERCIAL

## 2021-05-28 DIAGNOSIS — D75.89 OTHER SPECIFIED DISEASES OF BLOOD AND BLOOD-FORMING ORGANS: ICD-10-CM

## 2021-05-28 PROCEDURE — 99213 OFFICE O/P EST LOW 20 MIN: CPT | Mod: 95

## 2021-06-01 DIAGNOSIS — R79.89 OTHER SPECIFIED ABNORMAL FINDINGS OF BLOOD CHEMISTRY: ICD-10-CM

## 2021-06-03 NOTE — RESULTS/DATA
[FreeTextEntry1] : Hemochromatosis gene mutation : negative for C282Y ;  heterozygous for H63D mutation

## 2021-06-03 NOTE — ASSESSMENT
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 No hx of liver disease. \par R/o clinical hemochromatosis despite negative genetic testing.\par \par \par Will obtain liver MRI to evaluate hepatic iron content.\par \par If elevated- he will need therapeutic phlebotomies.\par \par D/w patient in details. Answered questions. He is concerned re risk of  liver damage due to excess iron.\par \par MIld chronic  macrocytosis- borderline . W/up negative. Not clinically significant. Monitor. \par \par Return visit after MRI.\par

## 2021-06-03 NOTE — HISTORY OF PRESENT ILLNESS
[Home] : at home, [unfilled] , at the time of the visit. [Medical Office: ([facility])___] : at the medical office located in  [Verbal consent obtained from patient] : the patient, [unfilled] [de-identified] : DONNA KAPLAN is a 30 y.o. with a PMH significant for multiple surgeries for a right knee patella fracture who is being referred to our office for macrocytosis and for a hx of an elevated Ferritin level. \par 4/3/21 - Routine labs for PCP visit - WBC: 6.18,  Hgb: 13.9   Hct:40.2   MCV: 106.1  Plts:  292  ESR: 2,     TSAT: 22%, B12: 511,  Folate: 8.8\par On review of old labs he had a Ferritin of 631 9/16/19, TSAT 23%, and a mildly elevated ALT of 59. Labs 1 month prior showed positive Hep A  Margareth (total, IgM neg), MCV at this time was 102.8. \par Patient reports after being told this he obtained his old medical records and trended his MCV over last 10 years.  He reports the lowest value was 98, highest was ~ 106.  \par He is not on, has not been on any long term meds.  He does not drink.  \par \par His major medical issue is multiple surgeries for a right knee patella fracture.  He has had at least 6 surgeries total starting 2004. \par Fractured 1st in 8th grade.  Apparently had ORIF, then had implants removed and had a refracture.  This was fixed but there was a concern for nonunion from another surgery and so needed repeat fixation. At one point there was delayed healing with a small gap requiring a bone growth stimulator.\par Then 6/25/17  he fell in the kitchen - poor healing.\par 7/6/20 - Repair of non-union and removal of implants.\par \par \par He reports chronic fatigue vs. lack of motivation.  He has trouble focusing and with his memory

## 2022-02-24 NOTE — ED ADULT TRIAGE NOTE - DIRECT TO ROOM CARE INITIATED:
Cm called Ct schedule recert  Cm left Vm       Ct called and confirms for 3- at 3:30pm 
25-Jun-2017 13:25

## 2022-04-10 ENCOUNTER — FORM ENCOUNTER (OUTPATIENT)
Age: 32
End: 2022-04-10

## 2022-04-12 ENCOUNTER — NON-APPOINTMENT (OUTPATIENT)
Age: 32
End: 2022-04-12

## 2022-04-21 ENCOUNTER — APPOINTMENT (OUTPATIENT)
Age: 32
End: 2022-04-21

## 2022-04-27 ENCOUNTER — FORM ENCOUNTER (OUTPATIENT)
Age: 32
End: 2022-04-27

## 2022-08-31 NOTE — ED ADULT TRIAGE NOTE - HEART RATE (BEATS/MIN)
ASQ-3 Screen    Results: Some Concerns/Monitor   Communication: Monitoring Score: 20   Gross Motor: Concerning Score: 20   Fine Motor: Monitoring Score: 35   Problem Solving: Concerning Score: 5   Personal-Social: Concerning Score: 25   Other Concerns:             Disposition: Continue current therapy   M-CHAT Performed?: Yes   M-CHAT Score/Comment: 3        Patient already in Birth to three.    74

## 2023-03-13 NOTE — ASU DISCHARGE PLAN (ADULT/PEDIATRIC). - MEDICATION SUMMARY - MEDICATIONS TO TAKE
I will START or STAY ON the medications listed below when I get home from the hospital:  None
Urology

## 2023-03-31 NOTE — ASU PATIENT PROFILE, ADULT - DOES PATIENT HAVE ADVANCE DIRECTIVE
[facility]  Vascular Surgery  Progress Note    Patient Name: Nelsy Marino  MRN: [id number]  Admission Date: 3/29/2023  Primary Care Provider: Juan F Garay    Subjective:     Interval History: No acute events. Received 2u pRBC. Confusion overnight, improved this morning. AM labs pending. NPO for wound wash out today     Post-Op Info:  Procedure(s) (LRB):  DEBRIDEMENT, WOUND (Left)   Day of Surgery       Medications:  Continuous Infusions:   heparin (porcine) in D5W 14 Units/kg/hr (03/31/23 0403)     Scheduled Meds:   amLODIPine  10 mg Oral Daily    ammonium lactate   Topical (Top) Daily    aspirin  81 mg Oral Daily    atorvastatin  40 mg Oral QHS    carvediloL  12.5 mg Oral BID    clopidogreL  75 mg Oral Daily    DAPTOmycin (CUBICIN) IV (PEDS and ADULTS)  6 mg/kg Intravenous Q24H    DULoxetine  30 mg Oral BID    furosemide  20 mg Oral Daily    pantoprazole  40 mg Oral BID     PRN Meds:sodium chloride, dextrose 10%, glucagon (human recombinant), heparin (PORCINE), heparin (PORCINE), insulin aspart U-100, ondansetron, oxyCODONE, sodium chloride 0.9%     Objective:     Vital Signs (Most Recent):  Temp: 98 °F (36.7 °C) (03/31/23 0353)  Pulse: 82 (03/31/23 0353)  Resp: 16 (03/31/23 0353)  BP: (!) 158/71 (03/31/23 0353)  SpO2: 99 % (03/31/23 0353)   Vital Signs (24h Range):  Temp:  [96.1 °F (35.6 °C)-98.1 °F (36.7 °C)] 98 °F (36.7 °C)  Pulse:  [75-91] 82  Resp:  [16-17] 16  SpO2:  [91 %-99 %] 99 %  BP: (112-158)/(55-71) 158/71         Physical Exam  Constitutional:       General: She is not in acute distress.     Appearance: She is not ill-appearing.   Cardiovascular:      Rate and Rhythm: Normal rate.      Comments: Right: DP Triphasic signal, PT Biphasic signal  Left: DP monophasic signal, PT monophasic signal  Pulmonary:      Effort: Pulmonary effort is normal. No respiratory distress.   Skin:     Comments: Left groin incision with nylon sutures. Dehisced with fibrinous material    Neurological:       Mental Status: She is alert.       Significant Labs:  CBC:   Recent Labs   Lab 03/30/23  0342   WBC 9.55   RBC 2.63*   HGB 6.3*   HCT 22.0*      MCV 84   MCH 24.0*   MCHC 28.6*       CMP:   Recent Labs   Lab 03/29/23  2322   *   CALCIUM 9.1   ALBUMIN 2.4*   PROT 7.5      K 4.8   CO2 24      BUN 46*   CREATININE 1.2   ALKPHOS 64   ALT 8*   AST 20   BILITOT 0.5         Significant Diagnostics:  I have reviewed all pertinent imaging results/findings within the past 24 hours.    Assessment/Plan:     PVD (peripheral vascular disease)  Nelsy Marino is a 83 y.o. woman with history of HTN, T2DM, DVT, and PAD who underwent left to right fem to SFA bypass and right SFA angioplasty on 3/2/23 and was admitted until 3/20. She was seen by Dr. Dunbar in the office earlier today complaining of left rest pain and also found to have dehiscence of her left groin incision.      - OR this morning for right groin debridement  - cont NPO  - Heparin gtt  - vanc/zosyn  - Insulin SS  - Home meds  - Asa plavix statin  - Podiatry consult to eval right TMA wound  - ID for chronic graft infection and new possibly infected graft  Will need revascularization of short segment occlusion left CFA        Teresa Conley MD  Vascular Surgery  [facility]   Yes

## 2023-12-17 ENCOUNTER — EMERGENCY (EMERGENCY)
Facility: HOSPITAL | Age: 33
LOS: 0 days | Discharge: HOME | End: 2023-12-17
Attending: EMERGENCY MEDICINE
Payer: SELF-PAY

## 2023-12-17 VITALS
DIASTOLIC BLOOD PRESSURE: 76 MMHG | HEART RATE: 90 BPM | OXYGEN SATURATION: 100 % | TEMPERATURE: 99 F | SYSTOLIC BLOOD PRESSURE: 120 MMHG

## 2023-12-17 VITALS — WEIGHT: 134.92 LBS | HEIGHT: 61 IN

## 2023-12-17 DIAGNOSIS — Z98.890 OTHER SPECIFIED POSTPROCEDURAL STATES: Chronic | ICD-10-CM

## 2023-12-17 DIAGNOSIS — S80.02XA CONTUSION OF LEFT KNEE, INITIAL ENCOUNTER: ICD-10-CM

## 2023-12-17 DIAGNOSIS — S82.001A UNSPECIFIED FRACTURE OF RIGHT PATELLA, INITIAL ENCOUNTER FOR CLOSED FRACTURE: ICD-10-CM
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
## 2023-12-17 DIAGNOSIS — M25.561 PAIN IN RIGHT KNEE: ICD-10-CM

## 2023-12-17 DIAGNOSIS — Z96.7 PRESENCE OF OTHER BONE AND TENDON IMPLANTS: Chronic | ICD-10-CM
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
## 2023-12-17 PROCEDURE — 73562 X-RAY EXAM OF KNEE 3: CPT | Mod: RT

## 2023-12-17 PROCEDURE — 73562 X-RAY EXAM OF KNEE 3: CPT | Mod: 26,RT

## 2023-12-17 PROCEDURE — 99284 EMERGENCY DEPT VISIT MOD MDM: CPT | Mod: 25

## 2023-12-17 PROCEDURE — 99285 EMERGENCY DEPT VISIT HI MDM: CPT

## 2023-12-17 RX ORDER — OXYCODONE AND ACETAMINOPHEN 5; 325 MG/1; MG/1
1 TABLET ORAL
Qty: 12 | Refills: 0
Start: 2023-12-17 | End: 2023-12-19

## 2023-12-17 NOTE — CONSULT NOTE ADULT - SUBJECTIVE AND OBJECTIVE BOX
HPI  33yMale w/ R patella fracture c/o R knee pain s/p mechanical fall. Able to bear weight in the RLE since the fall and was able to walk. Patient has a significant surgical history with the same right knee. He endorses having over 7 surgeries, including ORIF, removal of hardware, and  Denies headstrike or LOC. Denies numbness/tingling in the RLE. Denies any other trauma/injuries at this time. At baseline, community ambulator w/o assistive devices.    ROS  Negative unless otherwise specified in HPI.    PAST MEDICAL & SURGICAL Hx  PAST MEDICAL & SURGICAL HISTORY:  Anxiety      Patella fracture  2011 S/P ORIF      Knee pain, right      S/P ORIF (open reduction internal fixation) fracture  2011 right patella fracture      H/O right knee surgery  2008 for patella injury      MEDICATIONS  Home Medications:      ALLERGIES  No Known Allergies      FAMILY Hx  FAMILY HISTORY:      SOCIAL Hx  Social History:      VITALS  Vital Signs Last 24 Hrs  T(C): 37.1 (17 Dec 2023 14:41), Max: 37.1 (17 Dec 2023 14:41)  T(F): 98.7 (17 Dec 2023 14:41), Max: 98.7 (17 Dec 2023 14:41)  HR: 82 (17 Dec 2023 14:41) (82 - 82)  BP: 117/75 (17 Dec 2023 14:41) (117/75 - 117/75)  BP(mean): --  RR: --  SpO2: 95% (17 Dec 2023 14:41) (95% - 95%)    Parameters below as of 17 Dec 2023 14:41  Patient On (Oxygen Delivery Method): room air      PHYSICAL EXAM  Gen: Lying in bed, NAD  Resp: No increased WOB  RLE:  Skin intact, +edema and +ecchymosis over knee  +TTP over knee, mild palpable patellar defect, no TTP along remainder of extremity; compartments soft  Limited ROM of knee 2/2 pain  extensor mechanism intact with gravity eliminated  Motor: TA/EHL/GS/FHL intact  Sensory: DP/SP/Tib/Madelin/Saph SILT  +DP pulse, WWP    Secondary survey:  No TTP along spine or other extremities, pelvis grossly stable, SILT and compartments soft throughout    IMAGING  XRs: L patella fx (personal read)    ASSESSMENT & PLAN  33yMale w/ R patella fx s/p immobilization.  -WBAT RLE in a BJKI  -pain control  -ice/cold compress, elevation  -PT/OT daily   -no acute ortho surgery at this time  -f/u outpt with Dr. Obrien within 1-2 weeks, call office for appt

## 2023-12-17 NOTE — ED STATDOCS - NSFOLLOWUPINSTRUCTIONS_ED_ALL_ED_FT
Patellar Fracture, Adult  A person's knee joint, showing the patella.  A patellar fracture is a break in the kneecap. The kneecap is also called a patella. The patella is located on the front of the knee. A patellar fracture may make it difficult to walk or do other activities.    What are the causes?  This condition may be caused by:  A fall.  A hard and direct hit to the knee.  A very hard and strong bending of the knee.  Overuse of the knee due to repeated movements.  What increases the risk?  The following factors may make you more likely to develop this condition:  Playing contact sports or motor sports, especially sports that involve a lot of jumping.  Having bone problems or diseases of the bone, such as a condition that weakens the bones (osteoporosis) or a bone tumor.  Having poor strength or flexibility.  Having metabolism disorders, hormone problems, or nutrition disorders.  What are the signs or symptoms?  Symptoms of this condition include:  A tender and swollen knee.  Pain when moving your knee, especially when straightening it.  Difficulty walking or using your knee to support body weight.  A misshapen knee. It may look like a bone in your knee is out of place.  Hearing a noise, like a pop or a snap, in the front of your knee at the time of injury.  Having a feeling like a pop or a snap in the front of your knee at the time of injury.  How is this diagnosed?  This condition may be diagnosed based on your symptoms and medical history, a physical exam, and X-rays.    How is this treated?  Treatment for this condition depends on the type of fracture that you have.  If your patella is still in the right position after the fracture and you can still straighten your leg, you may need to wear a brace or cast for 4–6 weeks.  If your patella is broken into multiple pieces but you are able to straighten your leg, you may be treated with:  A brace or cast for 4–6 weeks.  In rare cases, the patella may need to be removed before the cast is applied.  Surgery. Surgery may be done to place wires, screws, or plates to hold the bones together while they heal.  If you cannot straighten your leg after a patellar fracture, you will have surgery to hold the patella together until it heals. After surgery, a brace or cast will be applied for 4–6 weeks.  You may be prescribed medicine to help relieve pain.  Follow these instructions at home:  Medicines    Take over-the-counter and prescription medicines only as told by your health care provider.  Ask your health care provider if the medicine prescribed to you:  Requires you to avoid driving or using machinery.  Can cause constipation. You may need to take these actions to prevent or treat constipation:  Drink enough fluid to keep your urine pale yellow.  Take over-the-counter or prescription medicines.  Eat foods that are high in fiber, such as beans, whole grains, and fresh fruits and vegetables.  Limit foods that are high in fat and processed sugars, such as fried or sweet foods.  If you have a removable brace:    Wear the brace as told by your health care provider. Remove it only as told by your health care provider.  Check the skin around the brace every day. Tell your health care provider about any concerns.  Loosen the brace if your toes tingle, become numb, or turn cold and blue.  Keep the brace clean and dry.  If you have a nonremovable cast:    Do not put pressure on any part of the cast until it is fully hardened. This may take several hours.  Do not stick anything inside the cast to scratch your skin. Doing that increases your risk of infection.  Check the skin around the cast every day. Tell your health care provider about any concerns.  You may put lotion on dry skin around the edges of the cast. Do not put lotion on the skin underneath the cast.  Keep the cast clean and dry.  Bathing    Do not take baths, swim, or use a hot tub until your health care provider approves. Ask your health care provider if you may take showers.  If the cast or brace is not waterproof:  Do not let it get wet.  Cover it with a watertight covering when you take a bath or a shower.  Managing pain, stiffness, and swelling    Bag of ice on a towel on the skin.  If directed, put ice on the injured area. To do this:  If you have a removable brace, remove it as told by your health care provider.  Put ice in a plastic bag.  Place a towel between your skin and the bag or between your cast and the bag.  Leave the ice on for 20 minutes, 2–3 times a day.  Remove the ice if your skin turns bright red. This is very important. If you cannot feel pain, heat, or cold, you have a greater risk of damage to the area.  Move your toes and ankle often to reduce stiffness and swelling.  Raise (elevate) the injured area above the level of your heart while you are sitting or lying down.  Activity    Do not use the injured limb to support your body weight until your health care provider says that you can. Use crutches or a walker as told by your health care provider.  Do exercises as told by your health care provider.  Ask your health care provider when it is safe to drive if you have a brace or cast on your leg.  Return to your normal activities as told by your health care provider. Ask your health care provider what activities are safe for you.  General instructions    Do not use any products that contain nicotine or tobacco. These products include cigarettes, chewing tobacco, and vaping devices, such as e-cigarettes. These can delay bone healing. If you need help quitting, ask your health care provider.  Keep all follow-up visits. This is important.  Contact a health care provider if:  You have a fever.  You have symptoms that get worse or do not get better after 2 weeks of treatment.  You have redness, more swelling, or increasing pain in your knee.  Get help right away if:  You have severe pain that does not go away.  You have blue or gray skin below the fracture site, or your toes on the affected side look blue or gray.  You have numbness or loss of feeling below the fracture site.  Summary  A patellar fracture is a break in the kneecap.  Depending on the type of fracture, you may need to wear a brace or cast, or you may need surgery.  You will need to have surgery if you are unable to straighten your leg.  Return to your normal activities as told by your health care provider. Ask your health care provider what activities are safe for you.  This information is not intended to replace advice given to you by your health care provider. Make sure you discuss any questions you have with your health care provider.    Document Revised: 07/26/2022 Document Reviewed: 07/26/2022 PLEASE FOLLOW UP WITH DR. REYES IN 1-2 WEEKS AS ADVISED.     Patellar Fracture, Adult  A person's knee joint, showing the patella.  A patellar fracture is a break in the kneecap. The kneecap is also called a patella. The patella is located on the front of the knee. A patellar fracture may make it difficult to walk or do other activities.    What are the causes?  This condition may be caused by:  A fall.  A hard and direct hit to the knee.  A very hard and strong bending of the knee.  Overuse of the knee due to repeated movements.  What increases the risk?  The following factors may make you more likely to develop this condition:  Playing contact sports or motor sports, especially sports that involve a lot of jumping.  Having bone problems or diseases of the bone, such as a condition that weakens the bones (osteoporosis) or a bone tumor.  Having poor strength or flexibility.  Having metabolism disorders, hormone problems, or nutrition disorders.  What are the signs or symptoms?  Symptoms of this condition include:  A tender and swollen knee.  Pain when moving your knee, especially when straightening it.  Difficulty walking or using your knee to support body weight.  A misshapen knee. It may look like a bone in your knee is out of place.  Hearing a noise, like a pop or a snap, in the front of your knee at the time of injury.  Having a feeling like a pop or a snap in the front of your knee at the time of injury.  How is this diagnosed?  This condition may be diagnosed based on your symptoms and medical history, a physical exam, and X-rays.    How is this treated?  Treatment for this condition depends on the type of fracture that you have.  If your patella is still in the right position after the fracture and you can still straighten your leg, you may need to wear a brace or cast for 4–6 weeks.  If your patella is broken into multiple pieces but you are able to straighten your leg, you may be treated with:  A brace or cast for 4–6 weeks.  In rare cases, the patella may need to be removed before the cast is applied.  Surgery. Surgery may be done to place wires, screws, or plates to hold the bones together while they heal.  If you cannot straighten your leg after a patellar fracture, you will have surgery to hold the patella together until it heals. After surgery, a brace or cast will be applied for 4–6 weeks.  You may be prescribed medicine to help relieve pain.  Follow these instructions at home:  Medicines    Take over-the-counter and prescription medicines only as told by your health care provider.  Ask your health care provider if the medicine prescribed to you:  Requires you to avoid driving or using machinery.  Can cause constipation. You may need to take these actions to prevent or treat constipation:  Drink enough fluid to keep your urine pale yellow.  Take over-the-counter or prescription medicines.  Eat foods that are high in fiber, such as beans, whole grains, and fresh fruits and vegetables.  Limit foods that are high in fat and processed sugars, such as fried or sweet foods.  If you have a removable brace:    Wear the brace as told by your health care provider. Remove it only as told by your health care provider.  Check the skin around the brace every day. Tell your health care provider about any concerns.  Loosen the brace if your toes tingle, become numb, or turn cold and blue.  Keep the brace clean and dry.  If you have a nonremovable cast:    Do not put pressure on any part of the cast until it is fully hardened. This may take several hours.  Do not stick anything inside the cast to scratch your skin. Doing that increases your risk of infection.  Check the skin around the cast every day. Tell your health care provider about any concerns.  You may put lotion on dry skin around the edges of the cast. Do not put lotion on the skin underneath the cast.  Keep the cast clean and dry.  Bathing    Do not take baths, swim, or use a hot tub until your health care provider approves. Ask your health care provider if you may take showers.  If the cast or brace is not waterproof:  Do not let it get wet.  Cover it with a watertight covering when you take a bath or a shower.  Managing pain, stiffness, and swelling    Bag of ice on a towel on the skin.  If directed, put ice on the injured area. To do this:  If you have a removable brace, remove it as told by your health care provider.  Put ice in a plastic bag.  Place a towel between your skin and the bag or between your cast and the bag.  Leave the ice on for 20 minutes, 2–3 times a day.  Remove the ice if your skin turns bright red. This is very important. If you cannot feel pain, heat, or cold, you have a greater risk of damage to the area.  Move your toes and ankle often to reduce stiffness and swelling.  Raise (elevate) the injured area above the level of your heart while you are sitting or lying down.  Activity    Do not use the injured limb to support your body weight until your health care provider says that you can. Use crutches or a walker as told by your health care provider.  Do exercises as told by your health care provider.  Ask your health care provider when it is safe to drive if you have a brace or cast on your leg.  Return to your normal activities as told by your health care provider. Ask your health care provider what activities are safe for you.  General instructions    Do not use any products that contain nicotine or tobacco. These products include cigarettes, chewing tobacco, and vaping devices, such as e-cigarettes. These can delay bone healing. If you need help quitting, ask your health care provider.  Keep all follow-up visits. This is important.  Contact a health care provider if:  You have a fever.  You have symptoms that get worse or do not get better after 2 weeks of treatment.  You have redness, more swelling, or increasing pain in your knee.  Get help right away if:  You have severe pain that does not go away.  You have blue or gray skin below the fracture site, or your toes on the affected side look blue or gray.  You have numbness or loss of feeling below the fracture site.  Summary  A patellar fracture is a break in the kneecap.  Depending on the type of fracture, you may need to wear a brace or cast, or you may need surgery.  You will need to have surgery if you are unable to straighten your leg.  Return to your normal activities as told by your health care provider. Ask your health care provider what activities are safe for you.  This information is not intended to replace advice given to you by your health care provider. Make sure you discuss any questions you have with your health care provider.    Document Revised: 07/26/2022 Document Reviewed: 07/26/2022

## 2023-12-17 NOTE — ED STATDOCS - MUSCULOSKELETAL, MLM
tenderness to tendon, tenderness across the patella mild diffusion, normal sensation, 2+ pulses distally

## 2023-12-17 NOTE — ED STATDOCS - NS ED ATTENDING STATEMENT MOD
This was a shared visit with the ANTONELLA. I reviewed and verified the documentation and independently performed the documented:

## 2023-12-17 NOTE — ED ADULT NURSE NOTE - OBJECTIVE STATEMENT
pt is A/Ox4 from home and is ambulatory. pt to the ED with c/o of R knee pain s/p post trip and fall. pt denies head strike or other injuries. pt respirations even and unlabored. pt in NAD.

## 2023-12-17 NOTE — ED STATDOCS - CARE PROVIDERS DIRECT ADDRESSES
[email or web address]

## 2023-12-17 NOTE — ED STATDOCS - CARE PROVIDER_API CALL
iFliberto Obrien  Orthopaedic Surgery  [address] [place] [id number]  Phone: [phone]  Fax: [phone]  Follow Up Time:    Filiberto Obrien  Orthopaedic Surgery  [address] [place] [id number]  Phone: [phone]  Fax: [phone]  Follow Up Time:

## 2023-12-17 NOTE — ED STATDOCS - WR ORDER NAME 1
Today you were found to have a urine infection. Please take keflex as prescribed.     Your ultrasound obtained for severe uterine cramping is normal with no cyst or twisting of the ovaries    Take motrin for severe uterine cramping during your period    Followup in 1-2 days with gynecology for a repeat evaluation for any persisting symptoms     Return to ED for new back pain, abdominal pain, fever or other change    Xray Knee 3 Views, Right

## 2023-12-17 NOTE — ED STATDOCS - NSICDXPASTSURGICALHX_GEN_ALL_CORE_FT
PAST SURGICAL HISTORY:  H/O right knee surgery 2008 for patella injury    S/P ORIF (open reduction internal fixation) fracture 2011 right patella fracture

## 2023-12-17 NOTE — ED STATDOCS - PATIENT PORTAL LINK FT
You can access the FollowMyHealth Patient Portal offered by [facility] by registering at the following website: [email or web address]. By joining [employer]’s FollowMyHealth portal, you will also be able to view your health information using other applications (apps) compatible with our system. You can access the FollowMyHealth Patient Portal offered by [facility] by registering at the following website: [email or web address]. By joining [employer]’s FollowMyHealth portal, you will also be able to view your health information using other applications (apps) compatible with our system.

## 2023-12-17 NOTE — ED ADULT TRIAGE NOTE - CHIEF COMPLAINT QUOTE
Trip and fall, complaining of right knee pain, no other injuries from fall. States he felt a crack in knee when he fell. Has a history of a prior injury to right knee. Unable to ambulate due to pain.

## 2023-12-17 NOTE — ED STATDOCS - OBJECTIVE STATEMENT
34 yo male wPMhx of prior right knee fx presents to the ED s/p trip and fall c/o right knee pain. Pt states he felt a crack in his knee when he fell. Pt unable to ambulate due to pain. Last surgery was 2022 with Dr. Echeverria and last injury to the knee. was in 2017. Did not take any medication for pain PTA. 32 yo male wPMhx of prior right knee fx presents to the ED s/p trip and fall c/o right knee pain. Pt states he felt a crack in his knee when he fell. Pt unable to ambulate due to pain. Last surgery was 2022 with Dr. Echeverria and last injury to the knee. was in 2017. Did not take any medication for pain PTA.

## 2023-12-17 NOTE — ED STATDOCS - CLINICAL SUMMARY MEDICAL DECISION MAKING FREE TEXT BOX
In summary this is a 33-year-old male with a history of right patellar fracture status post repair who presents emerged department with a chief complaint of right knee injury.  Limb is neurovascularly intact.  Extensor mechanism is intact.  There are no open wounds.  Possible fracture versus ligamental injury versus meniscal injury versus hardware trauma.  Will obtain x-ray to evaluate for osseous injury at this time.  Patient declines pain medications at this time.

## 2023-12-17 NOTE — ED STATDOCS - PROGRESS NOTE DETAILS
34 y/o M with PMH of right patella fracture managed with ORIF s/p revision presents with right knee pain after mechanical fall yesterday. States he tripped and felt a pop/crack in his right knee. Reports difficulty ambulating. Last procedure to knee was in 2022 with Dr. Echeverria. PE: Well appearing. Cardiac: s1s2, RRR. Lungs: CTAB. Abdomen: NBS x4, soft, nontender. MSK: +right knee edema. +TTP over patella, medial and superior aspect right knee. Sensation intact to light touch in lower extremities. 2+ DP/PT pulses. A/P: right knee pain s/p fall, plan for XRs, possible orthopedic consult. - Fredi Moreira PA-C Orthopedics to see patient in ED. - Fredi Moreira PA-C

## 2023-12-20 ENCOUNTER — APPOINTMENT (OUTPATIENT)
Dept: ORTHOPEDIC SURGERY | Facility: CLINIC | Age: 33
End: 2023-12-20
Payer: SELF-PAY

## 2023-12-20 VITALS
HEIGHT: 61 IN | HEART RATE: 92 BPM | WEIGHT: 135 LBS | SYSTOLIC BLOOD PRESSURE: 152 MMHG | DIASTOLIC BLOOD PRESSURE: 95 MMHG | BODY MASS INDEX: 25.49 KG/M2

## 2023-12-20 PROCEDURE — 99214 OFFICE O/P EST MOD 30 MIN: CPT

## 2023-12-20 NOTE — PHYSICAL EXAM
[de-identified] : General: Well appearing, no acute distress, A&O Neurologic: A&Ox3, No focal deficits Head: NCAT without abrasions, lacerations, or ecchymosis to head, face, or scalp Eyes: No scleral icterus, no gross abnormalities Respiratory: Equal chest wall expansion bilaterally, no accessory muscle use Lymphatic: No lymphadenopathy palpated Skin: Warm and dry Psychiatric: Normal mood and affect  right knee exam  Skin: well healed anterior surgical scars Inspection: No obvious malalignment, no masses, no swelling, no effusion Pulses: 2+ DP/PT pulses Strength: Unable to perform straight leg raise 5/5 TA/GS/EHL, no atrophy Neuro: Intact to light touch throughout,   [de-identified] : Plain films from [place] emergency department were reviewed.  There is evidence of a patella with sclerosis at the fracture site and increased widening of the nonunion site with compared to previous imaging

## 2023-12-20 NOTE — HISTORY OF PRESENT ILLNESS
[de-identified] : 32 yo M presents for follow up on R open patella fx s/p ORIF 8 surgeries due to nonhealing patella fx. He reports overall he had been doing well.  We have not seen him since 2020 and he had been doing well since till a new fall when he noted an inability for straight leg raise and a palpable defect on the anterior knee. He has an area on the lateral knee that is TTP. He had been following with endocrinologist and was found to be Vit D deficient & has been taking Vitamin D.  [5] : a current pain level of 5/10 [Bending] : worsened by bending [Lifting] : worsened by lifting [Recumbency] : relieved by recumbency [Rest] : relieved by rest

## 2023-12-20 NOTE — DISCUSSION/SUMMARY
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  We discussed that given his multiple failed previous surgeries, this is concerning that his failed once again.  I would recommend a full nonunion workup and evaluation by his primary care doctor, hematologist and endocrinologist prior to proceeding with revision surgery.  He states his insurance is on hold till January anyway but we will work on getting him scheduled.    It was explained to the patient that any surgical procedure carries with it the risks of loss of limb or loss of life. Medical complications include but are not limited to death or disability from a heart attack, stroke, GI bleeding, thrombophlebitis and pulmonary embolism, sepsis, adverse reactions including death due to blood transfusions, allergy or adverse drug reaction. There are other rare, unknown and uncommon systemic conditions that could also adversely affect the systemic outcome. Local complications include but are not limited to wound dehiscence, deep infection, failure of fixation or reconstruction, damage to nerves and vessels which could be temporary or permanent, as well as other rare, uncommon and unknown local complications that may necessitate re-operation, more complex orthopaedic reconstructions or amputation.  The patient was given the opportunity to ask questions and all questions were answered to their satisfaction.   Filiberto Obrien MD Orthopaedic Trauma Surgeon [facility] Orth[facility]edic  Orthopaedic Trauma, [facility]

## 2023-12-20 NOTE — REVIEW OF SYSTEMS
[Joint Pain] : joint pain [Joint Stiffness] : joint stiffness [Joint Swelling] : joint swelling [Negative] : Heme/Lymph [FreeTextEntry9] : Right knee pain

## 2024-01-05 ENCOUNTER — OUTPATIENT (OUTPATIENT)
Dept: OUTPATIENT SERVICES | Facility: HOSPITAL | Age: 34
LOS: 1 days | End: 2024-01-05
Payer: COMMERCIAL

## 2024-01-05 VITALS
HEART RATE: 65 BPM | SYSTOLIC BLOOD PRESSURE: 110 MMHG | WEIGHT: 134.26 LBS | TEMPERATURE: 97 F | RESPIRATION RATE: 20 BRPM | OXYGEN SATURATION: 99 % | HEIGHT: 61 IN | DIASTOLIC BLOOD PRESSURE: 70 MMHG

## 2024-01-05 DIAGNOSIS — Z98.890 OTHER SPECIFIED POSTPROCEDURAL STATES: Chronic | ICD-10-CM

## 2024-01-05 DIAGNOSIS — Z87.81 PERSONAL HISTORY OF (HEALED) TRAUMATIC FRACTURE: ICD-10-CM

## 2024-01-05 DIAGNOSIS — Z13.89 ENCOUNTER FOR SCREENING FOR OTHER DISORDER: ICD-10-CM

## 2024-01-05 DIAGNOSIS — Z01.818 ENCOUNTER FOR OTHER PREPROCEDURAL EXAMINATION: ICD-10-CM

## 2024-01-05 DIAGNOSIS — Z96.7 PRESENCE OF OTHER BONE AND TENDON IMPLANTS: Chronic | ICD-10-CM

## 2024-01-05 DIAGNOSIS — S82.009A UNSPECIFIED FRACTURE OF UNSPECIFIED PATELLA, INITIAL ENCOUNTER FOR CLOSED FRACTURE: ICD-10-CM

## 2024-01-05 DIAGNOSIS — Z29.9 ENCOUNTER FOR PROPHYLACTIC MEASURES, UNSPECIFIED: ICD-10-CM

## 2024-01-05 DIAGNOSIS — S82.90XK UNSPECIFIED FRACTURE OF UNSPECIFIED LOWER LEG, SUBSEQUENT ENCOUNTER FOR CLOSED FRACTURE WITH NONUNION: ICD-10-CM

## 2024-01-05 LAB
A1C WITH ESTIMATED AVERAGE GLUCOSE RESULT: 4.6 % — SIGNIFICANT CHANGE UP (ref 4–5.6)
A1C WITH ESTIMATED AVERAGE GLUCOSE RESULT: 4.6 % — SIGNIFICANT CHANGE UP (ref 4–5.6)
ANION GAP SERPL CALC-SCNC: 11 MMOL/L — SIGNIFICANT CHANGE UP (ref 5–17)
ANION GAP SERPL CALC-SCNC: 11 MMOL/L — SIGNIFICANT CHANGE UP (ref 5–17)
BASOPHILS # BLD AUTO: 0.03 K/UL — SIGNIFICANT CHANGE UP (ref 0–0.2)
BASOPHILS # BLD AUTO: 0.03 K/UL — SIGNIFICANT CHANGE UP (ref 0–0.2)
BASOPHILS NFR BLD AUTO: 0.6 % — SIGNIFICANT CHANGE UP (ref 0–2)
BASOPHILS NFR BLD AUTO: 0.6 % — SIGNIFICANT CHANGE UP (ref 0–2)
BLD GP AB SCN SERPL QL: SIGNIFICANT CHANGE UP
BLD GP AB SCN SERPL QL: SIGNIFICANT CHANGE UP
BUN SERPL-MCNC: 14.6 MG/DL — SIGNIFICANT CHANGE UP (ref 8–20)
BUN SERPL-MCNC: 14.6 MG/DL — SIGNIFICANT CHANGE UP (ref 8–20)
CALCIUM SERPL-MCNC: 9.4 MG/DL — SIGNIFICANT CHANGE UP (ref 8.4–10.5)
CALCIUM SERPL-MCNC: 9.4 MG/DL — SIGNIFICANT CHANGE UP (ref 8.4–10.5)
CHLORIDE SERPL-SCNC: 107 MMOL/L — SIGNIFICANT CHANGE UP (ref 96–108)
CHLORIDE SERPL-SCNC: 107 MMOL/L — SIGNIFICANT CHANGE UP (ref 96–108)
CO2 SERPL-SCNC: 25 MMOL/L — SIGNIFICANT CHANGE UP (ref 22–29)
CO2 SERPL-SCNC: 25 MMOL/L — SIGNIFICANT CHANGE UP (ref 22–29)
CREAT SERPL-MCNC: 0.88 MG/DL — SIGNIFICANT CHANGE UP (ref 0.5–1.3)
CREAT SERPL-MCNC: 0.88 MG/DL — SIGNIFICANT CHANGE UP (ref 0.5–1.3)
EGFR: 117 ML/MIN/1.73M2 — SIGNIFICANT CHANGE UP
EGFR: 117 ML/MIN/1.73M2 — SIGNIFICANT CHANGE UP
EOSINOPHIL # BLD AUTO: 0.07 K/UL — SIGNIFICANT CHANGE UP (ref 0–0.5)
EOSINOPHIL # BLD AUTO: 0.07 K/UL — SIGNIFICANT CHANGE UP (ref 0–0.5)
EOSINOPHIL NFR BLD AUTO: 1.4 % — SIGNIFICANT CHANGE UP (ref 0–6)
EOSINOPHIL NFR BLD AUTO: 1.4 % — SIGNIFICANT CHANGE UP (ref 0–6)
ESTIMATED AVERAGE GLUCOSE: 85 MG/DL — SIGNIFICANT CHANGE UP (ref 68–114)
ESTIMATED AVERAGE GLUCOSE: 85 MG/DL — SIGNIFICANT CHANGE UP (ref 68–114)
GLUCOSE SERPL-MCNC: 108 MG/DL — HIGH (ref 70–99)
GLUCOSE SERPL-MCNC: 108 MG/DL — HIGH (ref 70–99)
HCT VFR BLD CALC: 39.1 % — SIGNIFICANT CHANGE UP (ref 39–50)
HCT VFR BLD CALC: 39.1 % — SIGNIFICANT CHANGE UP (ref 39–50)
HGB BLD-MCNC: 13.9 G/DL — SIGNIFICANT CHANGE UP (ref 13–17)
HGB BLD-MCNC: 13.9 G/DL — SIGNIFICANT CHANGE UP (ref 13–17)
IMM GRANULOCYTES NFR BLD AUTO: 0.2 % — SIGNIFICANT CHANGE UP (ref 0–0.9)
IMM GRANULOCYTES NFR BLD AUTO: 0.2 % — SIGNIFICANT CHANGE UP (ref 0–0.9)
LYMPHOCYTES # BLD AUTO: 2.09 K/UL — SIGNIFICANT CHANGE UP (ref 1–3.3)
LYMPHOCYTES # BLD AUTO: 2.09 K/UL — SIGNIFICANT CHANGE UP (ref 1–3.3)
LYMPHOCYTES # BLD AUTO: 43.1 % — SIGNIFICANT CHANGE UP (ref 13–44)
LYMPHOCYTES # BLD AUTO: 43.1 % — SIGNIFICANT CHANGE UP (ref 13–44)
MCHC RBC-ENTMCNC: 35.5 GM/DL — SIGNIFICANT CHANGE UP (ref 32–36)
MCHC RBC-ENTMCNC: 35.5 GM/DL — SIGNIFICANT CHANGE UP (ref 32–36)
MCHC RBC-ENTMCNC: 35.7 PG — HIGH (ref 27–34)
MCHC RBC-ENTMCNC: 35.7 PG — HIGH (ref 27–34)
MCV RBC AUTO: 100.5 FL — HIGH (ref 80–100)
MCV RBC AUTO: 100.5 FL — HIGH (ref 80–100)
MONOCYTES # BLD AUTO: 0.35 K/UL — SIGNIFICANT CHANGE UP (ref 0–0.9)
MONOCYTES # BLD AUTO: 0.35 K/UL — SIGNIFICANT CHANGE UP (ref 0–0.9)
MONOCYTES NFR BLD AUTO: 7.2 % — SIGNIFICANT CHANGE UP (ref 2–14)
MONOCYTES NFR BLD AUTO: 7.2 % — SIGNIFICANT CHANGE UP (ref 2–14)
MRSA PCR RESULT.: SIGNIFICANT CHANGE UP
MRSA PCR RESULT.: SIGNIFICANT CHANGE UP
NEUTROPHILS # BLD AUTO: 2.3 K/UL — SIGNIFICANT CHANGE UP (ref 1.8–7.4)
NEUTROPHILS # BLD AUTO: 2.3 K/UL — SIGNIFICANT CHANGE UP (ref 1.8–7.4)
NEUTROPHILS NFR BLD AUTO: 47.5 % — SIGNIFICANT CHANGE UP (ref 43–77)
NEUTROPHILS NFR BLD AUTO: 47.5 % — SIGNIFICANT CHANGE UP (ref 43–77)
PLATELET # BLD AUTO: 327 K/UL — SIGNIFICANT CHANGE UP (ref 150–400)
PLATELET # BLD AUTO: 327 K/UL — SIGNIFICANT CHANGE UP (ref 150–400)
POTASSIUM SERPL-MCNC: 3.7 MMOL/L — SIGNIFICANT CHANGE UP (ref 3.5–5.3)
POTASSIUM SERPL-MCNC: 3.7 MMOL/L — SIGNIFICANT CHANGE UP (ref 3.5–5.3)
POTASSIUM SERPL-SCNC: 3.7 MMOL/L — SIGNIFICANT CHANGE UP (ref 3.5–5.3)
POTASSIUM SERPL-SCNC: 3.7 MMOL/L — SIGNIFICANT CHANGE UP (ref 3.5–5.3)
RBC # BLD: 3.89 M/UL — LOW (ref 4.2–5.8)
RBC # BLD: 3.89 M/UL — LOW (ref 4.2–5.8)
RBC # FLD: 14.5 % — SIGNIFICANT CHANGE UP (ref 10.3–14.5)
RBC # FLD: 14.5 % — SIGNIFICANT CHANGE UP (ref 10.3–14.5)
S AUREUS DNA NOSE QL NAA+PROBE: SIGNIFICANT CHANGE UP
S AUREUS DNA NOSE QL NAA+PROBE: SIGNIFICANT CHANGE UP
SODIUM SERPL-SCNC: 143 MMOL/L — SIGNIFICANT CHANGE UP (ref 135–145)
SODIUM SERPL-SCNC: 143 MMOL/L — SIGNIFICANT CHANGE UP (ref 135–145)
WBC # BLD: 4.85 K/UL — SIGNIFICANT CHANGE UP (ref 3.8–10.5)
WBC # BLD: 4.85 K/UL — SIGNIFICANT CHANGE UP (ref 3.8–10.5)
WBC # FLD AUTO: 4.85 K/UL — SIGNIFICANT CHANGE UP (ref 3.8–10.5)
WBC # FLD AUTO: 4.85 K/UL — SIGNIFICANT CHANGE UP (ref 3.8–10.5)

## 2024-01-05 PROCEDURE — G0463: CPT

## 2024-01-05 NOTE — H&P PST ADULT - PROBLEM SELECTOR PLAN 1
scheduled 01/12/2024 for right knee repair of non-union patella with Dr. Obrien  Patient educated on surgical scrub, ERAS, preadmission instructions, clearances, and day of procedure medications, verbalizes understanding, teach back method utilized.   pending medical clearance scheduled 01/12/2024 for right knee repair of non-union patella with Dr. Obrien  Patient educated on surgical scrub, ERAS, preadmission instructions, MRSA/MSSA NS, clearances, and day of procedure medications, verbalizes understanding, teach back method utilized.   pending medical clearance  stop vitamins 1 week prior to surgery  ok to continue iron HS

## 2024-01-05 NOTE — H&P PST ADULT - NSICDXPASTMEDICALHX_GEN_ALL_CORE_FT
PAST MEDICAL HISTORY:  Anxiety     Elevated ferritin     Elevated liver function tests     H/O macrocytosis     Knee pain, right     Patella fracture 2011 S/P ORIF    Vitamin D deficiency      PAST MEDICAL HISTORY:  Anxiety     Elevated ferritin     Elevated liver function tests     H/O macrocytosis     H/O malunion of fracture     HLD (hyperlipidemia)     GUERITA (iron deficiency anemia)     Knee pain, right     Patella fracture 2011 S/P ORIF    Patella fracture 2011 S/P ORIF    Vitamin D deficiency

## 2024-01-05 NOTE — H&P PST ADULT - POSTERIOR CERVICAL L
Mr. Natacha Santiago is here today for anticoagulation monitoring for the diagnosis of Atrial Fibrillation. His INR goal is 2.0-3.0 and his current Coumadin dose is 7.5 mg daily. Today's findings include an INR of 2.9     Considering Mr. Daigle's past history, todays findings, and per the coumadin policy/protocol, Mr. Tenisha Ross was instructed to take Coumadin as follows,  Continue taking 7.5 mg daily. He was also instructed to come back in 1 weeks for an INR check. A full discussion of the nature of anticoagulants has been carried out. A full discussion of the need for frequent and regular monitoring, precise dosage adjustment and compliance was stressed. Side effects of potential bleeding were discussed and Mr. Tenisha Ross was instructed to call [phone] if there are any signs of abnormal bleeding. Mr. Tenisha Ross was instructed to avoid any OTC items containing aspirin or ibuprofen and prior to starting any new OTC products to consult with his physician or pharmacist to ensure no drug interactions are present. Mr. Tenisha Ross was instructed to avoid any major changes in his general diet and to avoid alcohol consumption. .      Mr. Tenisha Ross verbalized his understanding of all instructions and will call the office with any questions, concerns, or signs of abnormal bleeding or blood clot. normal

## 2024-01-05 NOTE — H&P PST ADULT - ASSESSMENT
CAPRINI SCORE    AGE RELATED RISK FACTORS                                                             [ ] Age 41-60 years                                            (1 Point)  [ ] Age: 61-74 years                                           (2 Points)                 [ ] Age= 75 years                                                (3 Points)             DISEASE RELATED RISK FACTORS                                                       [ ] Edema in the lower extremities                 (1 Point)                     [ ] Varicose veins                                               (1 Point)                                 [ ] BMI > 25 Kg/m2                                            (1 Point)                                  [ ] Serious infection (ie PNA)                            (1 Point)                     [ ] Lung disease ( COPD, Emphysema)            (1 Point)                                                                          [ ] Acute myocardial infarction                         (1 Point)                  [ ] Congestive heart failure (in the previous month)  (1 Point)         [ ] Inflammatory bowel disease                            (1 Point)                  [ ] Central venous access, PICC or Port               (2 points)       (within the last month)                                                                [ ] Stroke (in the previous month)                        (5 Points)    [ ] Previous or present malignancy                       (2 points)                                                                                                                                                         HEMATOLOGY RELATED FACTORS                                                         [ ] Prior episodes of VTE                                     (3 Points)                     [ ] Positive family history for VTE                      (3 Points)                  [ ] Prothrombin 29106 A                                     (3 Points)                     [ ] Factor V Leiden                                                (3 Points)                        [ ] Lupus anticoagulants                                      (3 Points)                                                           [ ] Anticardiolipin antibodies                              (3 Points)                                                       [ ] High homocysteine in the blood                   (3 Points)                                             [ ] Other congenital or acquired thrombophilia      (3 Points)                                                [ ] Heparin induced thrombocytopenia                  (3 Points)                                        MOBILITY RELATED FACTORS  [ ] Bed rest                                                         (1 Point)  [ ] Plaster cast                                                    (2 points)  [ ] Bed bound for more than 72 hours           (2 Points)    GENDER SPECIFIC FACTORS  [ ] Pregnancy or had a baby within the last month   (1 Point)  [ ] Post-partum < 6 weeks                                   (1 Point)  [ ] Hormonal therapy  or oral contraception   (1 Point)  [ ] History of pregnancy complications              (1 point)  [ ] Unexplained or recurrent              (1 Point)    OTHER RISK FACTORS                                           (1 Point)  [ ] BMI >40, smoking, diabetes requiring insulin, chemotherapy  blood transfusions and length of surgery over 2 hours    SURGERY RELATED RISK FACTORS  [ ]  Section within the last month     (1 Point)  [ ] Minor surgery                                                  (1 Point)  [ ] Arthroscopic surgery                                       (2 Points)  [ ] Planned major surgery lasting more            (2 Points)      than 45 minutes     [ ] Elective hip or knee joint replacement       (5 points)       surgery                                                TRAUMA RELATED RISK FACTORS  [ ] Fracture of the hip, pelvis, or leg                       (5 Points)  [ ] Spinal cord injury resulting in paralysis             (5 points)       (in the previous month)    [ ] Paralysis  (less than 1 month)                             (5 Points)  [ ] Multiple Trauma within 1 month                        (5 Points)    Total Score [        ]    Caprini Score 0-2: Low Risk, NO VTE prophylaxis required for most patients, encourage ambulation  Caprini Score 3-6: Moderate Risk , pharmacologic VTE prophylaxis is indicated for most patients (in the absence of contraindications)  Caprini Score Greater than or =7: High risk, pharmocologic VTE prophylaxis indicated for most patients (in the absence of contraindications)                              OPIOID RISK TOOL    KASIA EACH BOX THAT APPLIES AND ADD TOTALS AT THE END    FAMILY HISTORY OF SUBSTANCE ABUSE                 FEMALE         MALE                                                Alcohol                             [  ]1 pt          [  ]3pts                                               Illegal Durgs                     [  ]2 pts        [  ]3pts                                               Rx Drugs                           [  ]4 pts        [  ]4 pts    PERSONAL HISTORY OF SUBSTANCE ABUSE                                                                                          Alcohol                             [  ]3 pts       [  ]3 pts                                               Illegal Drugs                     [  ]4 pts        [  ]4 pts                                               Rx Drugs                           [  ]5 pts        [  ]5 pts    AGE BETWEEN 16-45 YEARS                                      [  ]1 pt         [  ]1 pt    HISTORY OF PREADOLESCENT   SEXUAL ABUSE                                                             [  ]3 pts        [  ]0pts    PSYCHOLOGICAL DISEASE                     ADD, OCD, Bipolar, Schizophrenia        [  ]2 pts         [  ]2 pts                      Depression                                               [  ]1 pt           [  ]1 pt           SCORING TOTAL   (add numbers and type here)              (***)                                     A score of 3 or lower indicated LOW risk for future opioid abuse  A score of 4 to 7 indicated moderate risk for future opioid abuse  A score of 8 or higher indicates a high risk for opioid abuse         CAPRINI SCORE    AGE RELATED RISK FACTORS                                                             [ ] Age 41-60 years                                            (1 Point)  [ ] Age: 61-74 years                                           (2 Points)                 [ ] Age= 75 years                                                (3 Points)             DISEASE RELATED RISK FACTORS                                                       [ ] Edema in the lower extremities                 (1 Point)                     [ ] Varicose veins                                               (1 Point)                                 [ ] BMI > 25 Kg/m2                                            (1 Point)                                  [ ] Serious infection (ie PNA)                            (1 Point)                     [ ] Lung disease ( COPD, Emphysema)            (1 Point)                                                                          [ ] Acute myocardial infarction                         (1 Point)                  [ ] Congestive heart failure (in the previous month)  (1 Point)         [ ] Inflammatory bowel disease                            (1 Point)                  [ ] Central venous access, PICC or Port               (2 points)       (within the last month)                                                                [ ] Stroke (in the previous month)                        (5 Points)    [ ] Previous or present malignancy                       (2 points)                                                                                                                                                         HEMATOLOGY RELATED FACTORS                                                         [ ] Prior episodes of VTE                                     (3 Points)                     [ ] Positive family history for VTE                      (3 Points)                  [ ] Prothrombin 68973 A                                     (3 Points)                     [ ] Factor V Leiden                                                (3 Points)                        [ ] Lupus anticoagulants                                      (3 Points)                                                           [ ] Anticardiolipin antibodies                              (3 Points)                                                       [ ] High homocysteine in the blood                   (3 Points)                                             [ ] Other congenital or acquired thrombophilia      (3 Points)                                                [ ] Heparin induced thrombocytopenia                  (3 Points)                                        MOBILITY RELATED FACTORS  [ ] Bed rest                                                         (1 Point)  [ ] Plaster cast                                                    (2 points)  [ ] Bed bound for more than 72 hours           (2 Points)    GENDER SPECIFIC FACTORS  [ ] Pregnancy or had a baby within the last month   (1 Point)  [ ] Post-partum < 6 weeks                                   (1 Point)  [ ] Hormonal therapy  or oral contraception   (1 Point)  [ ] History of pregnancy complications              (1 point)  [ ] Unexplained or recurrent              (1 Point)    OTHER RISK FACTORS                                           (1 Point)  [ ] BMI >40, smoking, diabetes requiring insulin, chemotherapy  blood transfusions and length of surgery over 2 hours    SURGERY RELATED RISK FACTORS  [ ]  Section within the last month     (1 Point)  [ ] Minor surgery                                                  (1 Point)  [ ] Arthroscopic surgery                                       (2 Points)  [ ] Planned major surgery lasting more            (2 Points)      than 45 minutes     [ ] Elective hip or knee joint replacement       (5 points)       surgery                                                TRAUMA RELATED RISK FACTORS  [ ] Fracture of the hip, pelvis, or leg                       (5 Points)  [ ] Spinal cord injury resulting in paralysis             (5 points)       (in the previous month)    [ ] Paralysis  (less than 1 month)                             (5 Points)  [ ] Multiple Trauma within 1 month                        (5 Points)    Total Score [        ]    Caprini Score 0-2: Low Risk, NO VTE prophylaxis required for most patients, encourage ambulation  Caprini Score 3-6: Moderate Risk , pharmacologic VTE prophylaxis is indicated for most patients (in the absence of contraindications)  Caprini Score Greater than or =7: High risk, pharmocologic VTE prophylaxis indicated for most patients (in the absence of contraindications)                              OPIOID RISK TOOL    KASIA EACH BOX THAT APPLIES AND ADD TOTALS AT THE END    FAMILY HISTORY OF SUBSTANCE ABUSE                 FEMALE         MALE                                                Alcohol                             [  ]1 pt          [  ]3pts                                               Illegal Durgs                     [  ]2 pts        [  ]3pts                                               Rx Drugs                           [  ]4 pts        [  ]4 pts    PERSONAL HISTORY OF SUBSTANCE ABUSE                                                                                          Alcohol                             [  ]3 pts       [  ]3 pts                                               Illegal Drugs                     [  ]4 pts        [  ]4 pts                                               Rx Drugs                           [  ]5 pts        [  ]5 pts    AGE BETWEEN 16-45 YEARS                                      [  ]1 pt         [  ]1 pt    HISTORY OF PREADOLESCENT   SEXUAL ABUSE                                                             [  ]3 pts        [  ]0pts    PSYCHOLOGICAL DISEASE                     ADD, OCD, Bipolar, Schizophrenia        [  ]2 pts         [  ]2 pts                      Depression                                               [  ]1 pt           [  ]1 pt           SCORING TOTAL   (add numbers and type here)              (***)                                     A score of 3 or lower indicated LOW risk for future opioid abuse  A score of 4 to 7 indicated moderate risk for future opioid abuse  A score of 8 or higher indicates a high risk for opioid abuse     34 yo M Pmhx HLD/ GUERITA/ Vit D deficient under mgmt endo/ R open patella fx s/p ORIF x8 2/2 nonhealing patella fx. right knee pain described as 3/10 aching pain, worsens with bending and lifting, improves with rest. patient sustained patellar fracture in  and has had multiple corrective surgeries, 2020 he had repair with Dr. Obrien, and denies issues since  until recent fall 2023- he was unable to straighten the leg and raise it, and defect was noted on the anterior knee on palpation a/w tenderness. Given hx recurrent right patella nonunion- DR. Obrien recommended full nonunion workup and evaluation by primary care doctor, hematologist and endocrinologist prior to revision surgery. He is now scheduled 2024 for right knee repair of non-union patella with Dr. Obrien.    CAPRINI SCORE    AGE RELATED RISK FACTORS                                                             [ ] Age 41-60 years                                            (1 Point)  [ ] Age: 61-74 years                                           (2 Points)                 [ ] Age= 75 years                                                (3 Points)             DISEASE RELATED RISK FACTORS                                                       [ ] Edema in the lower extremities                 (1 Point)                     [ ] Varicose veins                                               (1 Point)                                 [x ] BMI > 25 Kg/m2                                            (1 Point)                                  [ ] Serious infection (ie PNA)                            (1 Point)                     [ ] Lung disease ( COPD, Emphysema)            (1 Point)                                                                          [ ] Acute myocardial infarction                         (1 Point)                  [ ] Congestive heart failure (in the previous month)  (1 Point)         [ ] Inflammatory bowel disease                            (1 Point)                  [ ] Central venous access, PICC or Port               (2 points)       (within the last month)                                                                [ ] Stroke (in the previous month)                        (5 Points)    [ ] Previous or present malignancy                       (2 points)                                                                                                                                                         HEMATOLOGY RELATED FACTORS                                                         [ ] Prior episodes of VTE                                     (3 Points)                     [ ] Positive family history for VTE                      (3 Points)                  [ ] Prothrombin 71491 A                                     (3 Points)                     [ ] Factor V Leiden                                                (3 Points)                        [ ] Lupus anticoagulants                                      (3 Points)                                                           [ ] Anticardiolipin antibodies                              (3 Points)                                                       [ ] High homocysteine in the blood                   (3 Points)                                             [ ] Other congenital or acquired thrombophilia      (3 Points)                                                [ ] Heparin induced thrombocytopenia                  (3 Points)                                        MOBILITY RELATED FACTORS  [ ] Bed rest                                                         (1 Point)  [ ] Plaster cast                                                    (2 points)  [ ] Bed bound for more than 72 hours           (2 Points)    GENDER SPECIFIC FACTORS  [ ] Pregnancy or had a baby within the last month   (1 Point)  [ ] Post-partum < 6 weeks                                   (1 Point)  [ ] Hormonal therapy  or oral contraception   (1 Point)  [ ] History of pregnancy complications              (1 point)  [ ] Unexplained or recurrent              (1 Point)    OTHER RISK FACTORS                                           (1 Point)  [x ] BMI >40, smoking, diabetes requiring insulin, chemotherapy  blood transfusions and length of surgery over 2 hours    SURGERY RELATED RISK FACTORS  [ ]  Section within the last month     (1 Point)  [ ] Minor surgery                                                  (1 Point)  [ ] Arthroscopic surgery                                       (2 Points)  [ x] Planned major surgery lasting more            (2 Points)      than 45 minutes     [ ] Elective hip or knee joint replacement       (5 points)       surgery                                                TRAUMA RELATED RISK FACTORS  [ ] Fracture of the hip, pelvis, or leg                       (5 Points)  [ ] Spinal cord injury resulting in paralysis             (5 points)       (in the previous month)    [ ] Paralysis  (less than 1 month)                             (5 Points)  [ ] Multiple Trauma within 1 month                        (5 Points)    Total Score [ 4       ]    Caprini Score 0-2: Low Risk, NO VTE prophylaxis required for most patients, encourage ambulation  Caprini Score 3-6: Moderate Risk , pharmacologic VTE prophylaxis is indicated for most patients (in the absence of contraindications)  Caprini Score Greater than or =7: High risk, pharmocologic VTE prophylaxis indicated for most patients (in the absence of contraindications)                              OPIOID RISK TOOL    KASIA EACH BOX THAT APPLIES AND ADD TOTALS AT THE END    FAMILY HISTORY OF SUBSTANCE ABUSE                 FEMALE         MALE                                                Alcohol                             [  ]1 pt          [  ]3pts                                               Illegal Durgs                     [  ]2 pts        [  ]3pts                                               Rx Drugs                           [  ]4 pts        [  ]4 pts    PERSONAL HISTORY OF SUBSTANCE ABUSE                                                                                          Alcohol                             [  ]3 pts       [  ]3 pts                                               Illegal Drugs                     [  ]4 pts        [  ]4 pts                                               Rx Drugs                           [  ]5 pts        [  ]5 pts    AGE BETWEEN 16-45 YEARS                                      [  ]1 pt         [  ]1 pt    HISTORY OF PREADOLESCENT   SEXUAL ABUSE                                                             [  ]3 pts        [  ]0pts    PSYCHOLOGICAL DISEASE                     ADD, OCD, Bipolar, Schizophrenia        [  ]2 pts         [  ]2 pts                      Depression                                               [  ]1 pt           [  ]1 pt           SCORING TOTAL   (add numbers and type here)              (1)                                     A score of 3 or lower indicated LOW risk for future opioid abuse  A score of 4 to 7 indicated moderate risk for future opioid abuse  A score of 8 or higher indicates a high risk for opioid abuse     32 yo M Pmhx HLD/ GUERITA/ Vit D deficient under mgmt endo/ R open patella fx s/p ORIF x8 2/2 nonhealing patella fx. right knee pain described as 3/10 aching pain, worsens with bending and lifting, improves with rest. patient sustained patellar fracture in  and has had multiple corrective surgeries, 2020 he had repair with Dr. Obrien, and denies issues since  until recent fall 2023- he was unable to straighten the leg and raise it, and defect was noted on the anterior knee on palpation a/w tenderness. Given hx recurrent right patella nonunion- DR. Obrien recommended full nonunion workup and evaluation by primary care doctor, hematologist and endocrinologist prior to revision surgery. He is now scheduled 2024 for right knee repair of non-union patella with Dr. Obrien.    CAPRINI SCORE    AGE RELATED RISK FACTORS                                                             [ ] Age 41-60 years                                            (1 Point)  [ ] Age: 61-74 years                                           (2 Points)                 [ ] Age= 75 years                                                (3 Points)             DISEASE RELATED RISK FACTORS                                                       [ ] Edema in the lower extremities                 (1 Point)                     [ ] Varicose veins                                               (1 Point)                                 [x ] BMI > 25 Kg/m2                                            (1 Point)                                  [ ] Serious infection (ie PNA)                            (1 Point)                     [ ] Lung disease ( COPD, Emphysema)            (1 Point)                                                                          [ ] Acute myocardial infarction                         (1 Point)                  [ ] Congestive heart failure (in the previous month)  (1 Point)         [ ] Inflammatory bowel disease                            (1 Point)                  [ ] Central venous access, PICC or Port               (2 points)       (within the last month)                                                                [ ] Stroke (in the previous month)                        (5 Points)    [ ] Previous or present malignancy                       (2 points)                                                                                                                                                         HEMATOLOGY RELATED FACTORS                                                         [ ] Prior episodes of VTE                                     (3 Points)                     [ ] Positive family history for VTE                      (3 Points)                  [ ] Prothrombin 00316 A                                     (3 Points)                     [ ] Factor V Leiden                                                (3 Points)                        [ ] Lupus anticoagulants                                      (3 Points)                                                           [ ] Anticardiolipin antibodies                              (3 Points)                                                       [ ] High homocysteine in the blood                   (3 Points)                                             [ ] Other congenital or acquired thrombophilia      (3 Points)                                                [ ] Heparin induced thrombocytopenia                  (3 Points)                                        MOBILITY RELATED FACTORS  [ ] Bed rest                                                         (1 Point)  [ ] Plaster cast                                                    (2 points)  [ ] Bed bound for more than 72 hours           (2 Points)    GENDER SPECIFIC FACTORS  [ ] Pregnancy or had a baby within the last month   (1 Point)  [ ] Post-partum < 6 weeks                                   (1 Point)  [ ] Hormonal therapy  or oral contraception   (1 Point)  [ ] History of pregnancy complications              (1 point)  [ ] Unexplained or recurrent              (1 Point)    OTHER RISK FACTORS                                           (1 Point)  [x ] BMI >40, smoking, diabetes requiring insulin, chemotherapy  blood transfusions and length of surgery over 2 hours    SURGERY RELATED RISK FACTORS  [ ]  Section within the last month     (1 Point)  [ ] Minor surgery                                                  (1 Point)  [ ] Arthroscopic surgery                                       (2 Points)  [ x] Planned major surgery lasting more            (2 Points)      than 45 minutes     [ ] Elective hip or knee joint replacement       (5 points)       surgery                                                TRAUMA RELATED RISK FACTORS  [ ] Fracture of the hip, pelvis, or leg                       (5 Points)  [ ] Spinal cord injury resulting in paralysis             (5 points)       (in the previous month)    [ ] Paralysis  (less than 1 month)                             (5 Points)  [ ] Multiple Trauma within 1 month                        (5 Points)    Total Score [ 4       ]    Caprini Score 0-2: Low Risk, NO VTE prophylaxis required for most patients, encourage ambulation  Caprini Score 3-6: Moderate Risk , pharmacologic VTE prophylaxis is indicated for most patients (in the absence of contraindications)  Caprini Score Greater than or =7: High risk, pharmocologic VTE prophylaxis indicated for most patients (in the absence of contraindications)                              OPIOID RISK TOOL    KASIA EACH BOX THAT APPLIES AND ADD TOTALS AT THE END    FAMILY HISTORY OF SUBSTANCE ABUSE                 FEMALE         MALE                                                Alcohol                             [  ]1 pt          [  ]3pts                                               Illegal Durgs                     [  ]2 pts        [  ]3pts                                               Rx Drugs                           [  ]4 pts        [  ]4 pts    PERSONAL HISTORY OF SUBSTANCE ABUSE                                                                                          Alcohol                             [  ]3 pts       [  ]3 pts                                               Illegal Drugs                     [  ]4 pts        [  ]4 pts                                               Rx Drugs                           [  ]5 pts        [  ]5 pts    AGE BETWEEN 16-45 YEARS                                      [  ]1 pt         [  ]1 pt    HISTORY OF PREADOLESCENT   SEXUAL ABUSE                                                             [  ]3 pts        [  ]0pts    PSYCHOLOGICAL DISEASE                     ADD, OCD, Bipolar, Schizophrenia        [  ]2 pts         [  ]2 pts                      Depression                                               [  ]1 pt           [  ]1 pt           SCORING TOTAL   (add numbers and type here)              (1)                                     A score of 3 or lower indicated LOW risk for future opioid abuse  A score of 4 to 7 indicated moderate risk for future opioid abuse  A score of 8 or higher indicates a high risk for opioid abuse

## 2024-01-05 NOTE — H&P PST ADULT - MUSCULOSKELETAL
details… right knee see HPI/no joint erythema/no calf tenderness/decreased ROM due to pain/decreased strength

## 2024-01-05 NOTE — H&P PST ADULT - HISTORY OF PRESENT ILLNESS
34 yo M Pmhx Vit D deficient under mgmt endo/ R open patella fx s/p ORIF x8 2/2 nonhealing patella fx. right knee pain described as 5/10... worsens with bending and lifting, improves with rest. S/p fall ... he was unable to straighten the leg and raise it, and defect was noted on the anterior knee on palpation a/w tenderness. Given hx recurrent right patella nonunion DR. Obrien recommended full nonunion workup and evaluation by primary care doctor, hematologist and endocrinologist prior to revision surgery. He is now scheduled 01/12/2024 for right knee repair of non-union patella with Dr. Obrien.     Current Meds  Escitalopram Oxalate 10 MG Oral Tablet  Fish Oil CAPS  Vitamin D TABS   32 yo M Pmhx HLD/ GUERITA/ Vit D deficient under mgmt endo/ R open patella fx s/p ORIF x8 2/2 nonhealing patella fx. right knee pain described as 3/10 aching pain, worsens with bending and lifting, improves with rest. patient sustained patellar fracture in 2004 and has had multiple corrective surgeries, 2020 he had repair with Dr. Obrien, and denies issues since 2020 until recent fall 12/17/2023- he was unable to straighten the leg and raise it, and defect was noted on the anterior knee on palpation a/w tenderness. Given hx recurrent right patella nonunion- DR. Obrien recommended full nonunion workup and evaluation by primary care doctor, hematologist and endocrinologist prior to revision surgery. He is now scheduled 01/12/2024 for right knee repair of non-union patella with Dr. Obrien. patient is using crutch for ambulation. patient reports feeling well today with o/w no acute concerns.      34 yo M Pmhx HLD/ GUERITA/ Vit D deficient under mgmt endo/ R open patella fx s/p ORIF x8 2/2 nonhealing patella fx. right knee pain described as 3/10 aching pain, worsens with bending and lifting, improves with rest. patient sustained patellar fracture in 2004 and has had multiple corrective surgeries, 2020 he had repair with Dr. Obrien, and denies issues since 2020 until recent fall 12/17/2023- he was unable to straighten the leg and raise it, and defect was noted on the anterior knee on palpation a/w tenderness. Given hx recurrent right patella nonunion- DR. Obrien recommended full nonunion workup and evaluation by primary care doctor, hematologist and endocrinologist prior to revision surgery. He is now scheduled 01/12/2024 for right knee repair of non-union patella with Dr. Obrien. patient is using crutch for ambulation. patient reports feeling well today with o/w no acute concerns.

## 2024-01-05 NOTE — H&P PST ADULT - NSICDXFAMILYHX_GEN_ALL_CORE_FT
FAMILY HISTORY:  Mother  Still living? Unknown  FH: HTN (hypertension), Age at diagnosis: Age Unknown    Grandparent  Still living? Unknown  FH: breast cancer, Age at diagnosis: Age Unknown

## 2024-01-08 ENCOUNTER — OUTPATIENT (OUTPATIENT)
Dept: OUTPATIENT SERVICES | Facility: HOSPITAL | Age: 34
LOS: 1 days | Discharge: HOME | End: 2024-01-08

## 2024-01-08 DIAGNOSIS — Z96.7 PRESENCE OF OTHER BONE AND TENDON IMPLANTS: Chronic | ICD-10-CM

## 2024-01-08 DIAGNOSIS — Z98.890 OTHER SPECIFIED POSTPROCEDURAL STATES: Chronic | ICD-10-CM

## 2024-01-08 DIAGNOSIS — D75.89 OTHER SPECIFIED DISEASES OF BLOOD AND BLOOD-FORMING ORGANS: ICD-10-CM

## 2024-01-08 PROBLEM — Z86.2 PERSONAL HISTORY OF DISEASES OF THE BLOOD AND BLOOD-FORMING ORGANS AND CERTAIN DISORDERS INVOLVING THE IMMUNE MECHANISM: Chronic | Status: ACTIVE | Noted: 2024-01-05

## 2024-01-08 PROBLEM — R79.89 OTHER SPECIFIED ABNORMAL FINDINGS OF BLOOD CHEMISTRY: Chronic | Status: ACTIVE | Noted: 2024-01-05

## 2024-01-08 PROBLEM — S82.009A UNSPECIFIED FRACTURE OF UNSPECIFIED PATELLA, INITIAL ENCOUNTER FOR CLOSED FRACTURE: Chronic | Status: ACTIVE | Noted: 2024-01-05

## 2024-01-08 PROBLEM — E55.9 VITAMIN D DEFICIENCY, UNSPECIFIED: Chronic | Status: ACTIVE | Noted: 2024-01-05

## 2024-01-08 PROBLEM — D50.9 IRON DEFICIENCY ANEMIA, UNSPECIFIED: Chronic | Status: ACTIVE | Noted: 2024-01-05

## 2024-01-08 PROBLEM — Z87.81 PERSONAL HISTORY OF (HEALED) TRAUMATIC FRACTURE: Chronic | Status: ACTIVE | Noted: 2024-01-05

## 2024-01-08 PROBLEM — E78.5 HYPERLIPIDEMIA, UNSPECIFIED: Chronic | Status: ACTIVE | Noted: 2024-01-05

## 2024-01-09 ENCOUNTER — APPOINTMENT (OUTPATIENT)
Dept: HEMATOLOGY ONCOLOGY | Facility: CLINIC | Age: 34
End: 2024-01-09
Payer: COMMERCIAL

## 2024-01-09 ENCOUNTER — APPOINTMENT (OUTPATIENT)
Dept: HEMATOLOGY ONCOLOGY | Facility: CLINIC | Age: 34
End: 2024-01-09

## 2024-01-09 VITALS
HEIGHT: 61 IN | BODY MASS INDEX: 26.06 KG/M2 | TEMPERATURE: 98.3 F | OXYGEN SATURATION: 100 % | RESPIRATION RATE: 16 BRPM | WEIGHT: 138 LBS | SYSTOLIC BLOOD PRESSURE: 146 MMHG | DIASTOLIC BLOOD PRESSURE: 92 MMHG | HEART RATE: 91 BPM

## 2024-01-09 DIAGNOSIS — R79.89 OTHER SPECIFIED ABNORMAL FINDINGS OF BLOOD CHEMISTRY: ICD-10-CM

## 2024-01-09 PROCEDURE — 99214 OFFICE O/P EST MOD 30 MIN: CPT

## 2024-01-09 NOTE — REVIEW OF SYSTEMS
[Diarrhea: Grade 0] : Diarrhea: Grade 0 [Joint Pain] : joint pain [Negative] : Allergic/Immunologic [FreeTextEntry9] : R knee

## 2024-01-09 NOTE — ASSESSMENT
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 Evaluated here in 2021 for elevated ferritin. MRI of liver showed mild liver iron depositions. He had multiple phlebotomies in the past ( donated blood in [facility] to [facility]) . Per history- labs showed that his iron became low ( do not have records) and low dose of oral  iron was recommended about one year ago. Low iron was probably due to excessive blood donations. He has not had follow up  iron studies done in  almost a year.  Recommend : stop iron and repeat iron studies- can be done after upcoming orthopedic surgery ( he  did not want to have blood drawn today).   Hx of recurrent patella nonunion. Multiple injuries to right patella/ failed surgeries. He does not have hematologic conditions which could potentially contribute  to his ongoing orthopedic issues.  From hematology point- cleared for surgery.        Will obtain liver MRI to evaluate hepatic iron content.    If elevated- he will need therapeutic phlebotomies.

## 2024-01-09 NOTE — HISTORY OF PRESENT ILLNESS
[de-identified] : Evaluated here in 2021 for elevated ferritin levels ( ferritin ~ 840, normal ESR). Hereditary hemochromatosis panel : heterozygous H63D, negative for C282Y. Liver MRI very mild iron excess. Recommended- periodic phlebotomies. He was going to [facility] and donated blood several times a year. Did not follow up here. About one year ago he was told by his PCP that his iron was slightly low and recommended oral iron supplement- takin OTC every other day for many months (  has not have iron studies done in  many months)   His major medical issue is multiple surgeries for a right knee patella fracture.  He has had several surgeries starting 2004.  Fractured 1st in 8th grade.  Apparently had ORIF, then had implants removed and had a refracture.  He had 8 surgeries over last several years due to nonhealing patella fx and repeated injuries to his right knee. Most recently he fell on his right knee ( tripped) and re-injured his patella. He is scheduled for another surgery in few days. Because of recurrent right patella nonunion his orthopedic surgeon recommended medical, endocrinology and hematologic evaluation.  He reports chronic fatigue vs. lack of motivation.  He has trouble focusing and with his memory

## 2024-01-10 DIAGNOSIS — S82.031K: ICD-10-CM

## 2024-01-10 DIAGNOSIS — R79.89 OTHER SPECIFIED ABNORMAL FINDINGS OF BLOOD CHEMISTRY: ICD-10-CM

## 2024-01-11 ENCOUNTER — TRANSCRIPTION ENCOUNTER (OUTPATIENT)
Age: 34
End: 2024-01-11

## 2024-01-11 NOTE — ASU PATIENT PROFILE, ADULT - NSICDXPASTMEDICALHX_GEN_ALL_CORE_FT
PAST MEDICAL HISTORY:  Anxiety     Elevated ferritin     Elevated liver function tests     H/O macrocytosis     H/O malunion of fracture     HLD (hyperlipidemia)     GUERITA (iron deficiency anemia)     Knee pain, right     Patella fracture 2011 S/P ORIF    Patella fracture 2011 S/P ORIF    Vitamin D deficiency

## 2024-01-12 ENCOUNTER — TRANSCRIPTION ENCOUNTER (OUTPATIENT)
Age: 34
End: 2024-01-12

## 2024-01-12 ENCOUNTER — APPOINTMENT (OUTPATIENT)
Dept: ORTHOPEDIC SURGERY | Facility: HOSPITAL | Age: 34
End: 2024-01-12

## 2024-01-12 ENCOUNTER — RESULT REVIEW (OUTPATIENT)
Age: 34
End: 2024-01-12

## 2024-01-12 ENCOUNTER — OUTPATIENT (OUTPATIENT)
Dept: INPATIENT UNIT | Facility: HOSPITAL | Age: 34
LOS: 1 days | End: 2024-01-12
Payer: COMMERCIAL

## 2024-01-12 VITALS
SYSTOLIC BLOOD PRESSURE: 109 MMHG | OXYGEN SATURATION: 100 % | DIASTOLIC BLOOD PRESSURE: 74 MMHG | RESPIRATION RATE: 16 BRPM | TEMPERATURE: 98 F | HEART RATE: 92 BPM

## 2024-01-12 VITALS
RESPIRATION RATE: 16 BRPM | WEIGHT: 134.04 LBS | HEIGHT: 61 IN | SYSTOLIC BLOOD PRESSURE: 124 MMHG | DIASTOLIC BLOOD PRESSURE: 75 MMHG | TEMPERATURE: 98 F | HEART RATE: 85 BPM

## 2024-01-12 DIAGNOSIS — S82.031K: ICD-10-CM

## 2024-01-12 DIAGNOSIS — Z96.7 PRESENCE OF OTHER BONE AND TENDON IMPLANTS: Chronic | ICD-10-CM

## 2024-01-12 DIAGNOSIS — Z98.890 OTHER SPECIFIED POSTPROCEDURAL STATES: Chronic | ICD-10-CM

## 2024-01-12 PROCEDURE — C1889: CPT

## 2024-01-12 PROCEDURE — 36415 COLL VENOUS BLD VENIPUNCTURE: CPT

## 2024-01-12 PROCEDURE — 76000 FLUOROSCOPY <1 HR PHYS/QHP: CPT

## 2024-01-12 PROCEDURE — 73560 X-RAY EXAM OF KNEE 1 OR 2: CPT | Mod: 26,RT

## 2024-01-12 PROCEDURE — 73560 X-RAY EXAM OF KNEE 1 OR 2: CPT

## 2024-01-12 PROCEDURE — 27524 TREAT KNEECAP FRACTURE: CPT | Mod: RT

## 2024-01-12 PROCEDURE — C1713: CPT

## 2024-01-12 PROCEDURE — 27470 REPAIR OF THIGH: CPT | Mod: RT

## 2024-01-12 DEVICE — IMPLANTABLE DEVICE: Type: IMPLANTABLE DEVICE | Status: FUNCTIONAL

## 2024-01-12 DEVICE — SCREW LKG VA SLF TAP W/ T8 STARDRIVE 2.7X10MM: Type: IMPLANTABLE DEVICE | Status: FUNCTIONAL

## 2024-01-12 DEVICE — SCREW LKG VA SLF TAP W/ T8 STARDRIVE 2.7X16MM: Type: IMPLANTABLE DEVICE | Status: FUNCTIONAL

## 2024-01-12 DEVICE — SCREW LKG VA SLF TAP W/ T8 STARDRIVE 2.7X26MM: Type: IMPLANTABLE DEVICE | Status: FUNCTIONAL

## 2024-01-12 DEVICE — SCREW LKG VA SLF TAP W/ T8 STARDRIVE 2.7X14MM: Type: IMPLANTABLE DEVICE | Status: FUNCTIONAL

## 2024-01-12 DEVICE — SCREW LKG VA SLF TAP W/ T8 STARDRIVE 2.7X12MM: Type: IMPLANTABLE DEVICE | Status: FUNCTIONAL

## 2024-01-12 DEVICE — SCREW LKG VA SLF TAP W/ T8 STARDRIVE 2.7X38MM: Type: IMPLANTABLE DEVICE | Status: FUNCTIONAL

## 2024-01-12 DEVICE — SCREW SLFTP METAPHYSEAL 2.7X40MM: Type: IMPLANTABLE DEVICE | Status: FUNCTIONAL

## 2024-01-12 DEVICE — SCREW LKG VA SLF TAP W/ T8 STARDRIVE 2.7X22MM: Type: IMPLANTABLE DEVICE | Status: FUNCTIONAL

## 2024-01-12 RX ORDER — SODIUM CHLORIDE 9 MG/ML
1000 INJECTION, SOLUTION INTRAVENOUS
Refills: 0 | Status: DISCONTINUED | OUTPATIENT
Start: 2024-01-12 | End: 2024-01-12

## 2024-01-12 RX ORDER — ONDANSETRON 8 MG/1
4 TABLET, FILM COATED ORAL ONCE
Refills: 0 | Status: DISCONTINUED | OUTPATIENT
Start: 2024-01-12 | End: 2024-01-26

## 2024-01-12 RX ORDER — FENTANYL CITRATE 50 UG/ML
25 INJECTION INTRAVENOUS
Refills: 0 | Status: DISCONTINUED | OUTPATIENT
Start: 2024-01-12 | End: 2024-01-12

## 2024-01-12 RX ORDER — CEFAZOLIN SODIUM 1 G
2000 VIAL (EA) INJECTION ONCE
Refills: 0 | Status: COMPLETED | OUTPATIENT
Start: 2024-01-12 | End: 2024-01-12

## 2024-01-12 RX ORDER — CEFAZOLIN SODIUM 1 G
2000 VIAL (EA) INJECTION ONCE
Refills: 0 | Status: DISCONTINUED | OUTPATIENT
Start: 2024-01-12 | End: 2024-01-12

## 2024-01-12 RX ORDER — SENNA PLUS 8.6 MG/1
2 TABLET ORAL
Qty: 14 | Refills: 0
Start: 2024-01-12 | End: 2024-01-18

## 2024-01-12 RX ORDER — KETOROLAC TROMETHAMINE 30 MG/ML
15 SYRINGE (ML) INJECTION ONCE
Refills: 0 | Status: DISCONTINUED | OUTPATIENT
Start: 2024-01-12 | End: 2024-01-12

## 2024-01-12 RX ORDER — OXYCODONE HYDROCHLORIDE 5 MG/1
1 TABLET ORAL
Qty: 28 | Refills: 0
Start: 2024-01-12 | End: 2024-01-18

## 2024-01-12 RX ORDER — OXYCODONE HYDROCHLORIDE 5 MG/1
5 TABLET ORAL
Refills: 0 | Status: DISCONTINUED | OUTPATIENT
Start: 2024-01-12 | End: 2024-01-12

## 2024-01-12 RX ORDER — FERROUS SULFATE 325(65) MG
1 TABLET ORAL
Refills: 0 | DISCHARGE

## 2024-01-12 RX ORDER — SODIUM CHLORIDE 9 MG/ML
3 INJECTION INTRAMUSCULAR; INTRAVENOUS; SUBCUTANEOUS ONCE
Refills: 0 | Status: DISCONTINUED | OUTPATIENT
Start: 2024-01-12 | End: 2024-01-12

## 2024-01-12 RX ORDER — FENOFIBRATE,MICRONIZED 130 MG
1 CAPSULE ORAL
Refills: 0 | DISCHARGE

## 2024-01-12 RX ORDER — ATORVASTATIN CALCIUM 80 MG/1
1 TABLET, FILM COATED ORAL
Refills: 0 | DISCHARGE

## 2024-01-12 RX ORDER — OXYCODONE HYDROCHLORIDE 5 MG/1
10 TABLET ORAL
Refills: 0 | Status: DISCONTINUED | OUTPATIENT
Start: 2024-01-12 | End: 2024-01-12

## 2024-01-12 RX ORDER — ASPIRIN/CALCIUM CARB/MAGNESIUM 324 MG
1 TABLET ORAL
Qty: 28 | Refills: 0
Start: 2024-01-12 | End: 2024-02-08

## 2024-01-12 RX ADMIN — Medication 2000 MILLIGRAM(S): at 08:05

## 2024-01-12 RX ADMIN — Medication 15 MILLIGRAM(S): at 10:17

## 2024-01-12 NOTE — ASU DISCHARGE PLAN (ADULT/PEDIATRIC) - ASU DC SPECIAL INSTRUCTIONSFT
Weight bearing as tolerated.  Keep knee immobilizer on at all times   Pain medication sent to your pharmacy   Aspirin 325 will be taken once a day for blood clot prevention   Keep incision clean and dry  Call Dr Obrien's office to confirm appointment. Weight bearing as tolerated.  Keep knee immobilizer on at all times   Pain medication sent to your pharmacy   Aspirin 325 will be taken once a day for blood clot prevention   Keep incision clean and dry  Do not remove bandage until you follow up in Dr Obrien's Office.  You MUST FOLLOW UP WITHIN 5-7 days.  If the devise seems to be malfunctioning or beeping please call the doctors office and make them aware.   Call Dr Obrien's office to confirm appointment.

## 2024-01-12 NOTE — ASU DISCHARGE PLAN (ADULT/PEDIATRIC) - NS MD DC FALL RISK RISK
For information on Fall & Injury Prevention, visit: [email or web address] OR  [email or web address] OR  https://www.cdc.gov/steadi/patient.html For information on Fall & Injury Prevention, visit: [email or web address]://www.[email or web address]/[email or web address]-[email or web address] OR  https://[email or web address]/[email or web address]-tips-[email or web address]-avoid-injury OR  https://www.cdc.gov/steadi/patient.html intact

## 2024-01-12 NOTE — BRIEF OPERATIVE NOTE - COMMENTS
post-op plan: WBAT in immobilizer, immobilizer at all times, preveena x 1 week, PT/OT, ancef per protocol, contralateral SCD, asa x 4 weeks post-op.  F/U ortho 3 weeks for wound check and x-rays and to start ROM pre protocol

## 2024-01-12 NOTE — ASU DISCHARGE PLAN (ADULT/PEDIATRIC) - CARE PROVIDER_API CALL
Filiberto Obrien  Orthopaedic Surgery  [address] [place] [id number]  Phone: [phone]  Fax: [phone]  Follow Up Time:    Filiberto Obrien  Orthopaedic Surgery  [address] [place] [id number]  Phone: [phone]  Fax: [phone]  Follow Up Time:

## 2024-01-12 NOTE — BRIEF OPERATIVE NOTE - NSICDXBRIEFPROCEDURE_GEN_ALL_CORE_FT
PROCEDURES:  Repair, fracture nonunion or malunion, patella, using bone autograft 12-Jan-2024 10:17:56  Filiberto Obrien

## 2024-01-12 NOTE — BRIEF OPERATIVE NOTE - NSICDXBRIEFPREOP_GEN_ALL_CORE_FT
PRE-OP DIAGNOSIS:  Closed fracture of right patella with nonunion 12-Jan-2024 10:18:11  Filiberto Obrien

## 2024-01-18 ENCOUNTER — APPOINTMENT (OUTPATIENT)
Dept: ORTHOPEDIC SURGERY | Facility: CLINIC | Age: 34
End: 2024-01-18
Payer: COMMERCIAL

## 2024-01-18 PROCEDURE — 99024 POSTOP FOLLOW-UP VISIT: CPT

## 2024-01-24 ENCOUNTER — APPOINTMENT (OUTPATIENT)
Dept: ORTHOPEDIC SURGERY | Facility: CLINIC | Age: 34
End: 2024-01-24

## 2024-01-24 ENCOUNTER — APPOINTMENT (OUTPATIENT)
Dept: ORTHOPEDIC SURGERY | Facility: CLINIC | Age: 34
End: 2024-01-24
Payer: COMMERCIAL

## 2024-01-24 PROCEDURE — 99024 POSTOP FOLLOW-UP VISIT: CPT

## 2024-01-24 NOTE — HISTORY OF PRESENT ILLNESS
[___ Days Post Op] : post op day #[unfilled] [de-identified] : POS:  Repair of right patella nonunion. DOS: 01/12/2023 [de-identified] : Nazario is a 33 year old male who is s/p patella nonunion 1/12/24. Patient reports that he has been doing well and has no pain. He has been walking with the devika locked in extension. Denies any numbness or tingling. [de-identified] : GENERAL: alert, cooperative, in NAD SKIN: The skin is intact, warm, pink and dry over the area examined. EYES: Normal conjunctiva, normal eyelids and pupils were equal and round. ENT: normal ears, normal nose and normal lips. CARDIOVASCULAR: brisk capillary refill, but no peripheral edema. RESPIRATORY: The patient is in no apparent respiratory distress. They're taking full deep breaths without use of accessory muscles or evidence of audible wheezes or stridor without the use of a stethoscope. Normal respiratory effort. ABDOMEN: not examined.  RLE Skin intact, incisions clean, dry and intact, no tenderness to palpation, no range of motion testing, cap refill 2+ [de-identified] : Xrays from 1/18 AP and lateral of the right knee reviewed showing a healing patella with hardware in appropriate alignment.  [de-identified] : 33 year  old male s/p Repair of right patella nonunion. DOS: 01/12/2023 [de-identified] :  We had a thorough discussion in regards to diagnosis, prognosis and treatment modalities. The patient clinically is doing well and is walking without pain. He has no tenderness to palpation. Radiographs show the patella is healing well. Due to the patients substantial surgical history, I feel it is appropriate to hold off on range of motion at this time. We will begin range of motion in 2-3 weeks. A Prescription for physical therapy was given. Patient can follow up as needed or if any concerns develop. He will call the office with any concerns.   There was verbal understanding and all questions were answered.   Dwayne Rosario DO  Orthopaedic Trauma Surgeon Addendum:  I agree with the above resident physician note.   Chart was reviewed and patient examined in the orthopedic office. I agree with the assessment and plan of the resident.  I was physically present for the key portions of the evaluation and management service provided. I agree with the above history, physical and plan. Appropriate imaging has been reviewed and the plan adjusted as needed.  Filiberto Obrien MD Orthopaedic Trauma Surgeon [facility] Orthopaedic [place]

## 2024-02-01 ENCOUNTER — APPOINTMENT (OUTPATIENT)
Dept: ORTHOPEDIC SURGERY | Facility: CLINIC | Age: 34
End: 2024-02-01
Payer: COMMERCIAL

## 2024-02-01 DIAGNOSIS — M25.561 PAIN IN RIGHT KNEE: ICD-10-CM

## 2024-02-01 PROCEDURE — 99024 POSTOP FOLLOW-UP VISIT: CPT

## 2024-02-01 NOTE — HISTORY OF PRESENT ILLNESS
[de-identified] : Status post ORIF of right patella nonunion fracture on 1/12/2024, Dr. Obrien [de-identified] : Nazario does present today for a check of his wound check status post ORIF of the right patella on 1/12/2024.  He is overall doing well.  He is weightbearing as tolerated with use of his postop knee brace and use of crutches.  He did note an area of suture protruding from the most superior aspect of the incision approximately 2 to 3 days ago.  He denies any warmth or erythema from this area.  No discharge.  He denies fever, chills or other constitutional symptoms. [de-identified] : Examination of the right knee revealed a well-healed surgical incision without erythema, warmth, or dehiscence.  At the very most superior aspect of the incision there is evidence of a Monocryl suture knot.  It was removed in the office after the area was sterilely cleansed with rubbing alcohol.  A Steri-Strip was placed over the superior aspect of the site.  Integrity of the incision maintained. [de-identified] : Nazario is a 33-year-old male status post right knee patella ORIF of a nonunion fracture on 1/12/2024.  No signs or symptoms of infection were noted.  A small piece of Monocryl suture was removed from the most superior aspect of the incision.  He may continue to wash over this area once daily.  He will follow-up for his regular scheduled visit.  He will follow-up immediately should he develop any pain, discharge, warmth or erythema to the area.  All questions answered.

## 2024-03-06 ENCOUNTER — APPOINTMENT (OUTPATIENT)
Dept: ORTHOPEDIC SURGERY | Facility: CLINIC | Age: 34
End: 2024-03-06
Payer: COMMERCIAL

## 2024-03-06 PROCEDURE — 99024 POSTOP FOLLOW-UP VISIT: CPT

## 2024-03-06 PROCEDURE — 73560 X-RAY EXAM OF KNEE 1 OR 2: CPT | Mod: RT

## 2024-03-06 NOTE — HISTORY OF PRESENT ILLNESS
[___ Weeks Post Op] : [unfilled] weeks post op [None] : None [de-identified] : POS:  Repair of right patella nonunion. DOS: 01/12/2023 [de-identified] : Nazario is a 33 year old male who is s/p patella nonunion 1/12/24. Patient reports that he has been doing well and has no pain. He has been walking with the devika at 30 degrees extension. Denies any numbness or tingling. [de-identified] : GENERAL: alert, cooperative, in NAD SKIN: The skin is intact, warm, pink and dry over the area examined. EYES: Normal conjunctiva, normal eyelids and pupils were equal and round. ENT: normal ears, normal nose and normal lips. CARDIOVASCULAR: brisk capillary refill, but no peripheral edema. RESPIRATORY: The patient is in no apparent respiratory distress. They're taking full deep breaths without use of accessory muscles or evidence of audible wheezes or stridor without the use of a stethoscope. Normal respiratory effort. ABDOMEN: not examined.  RLE Skin intact, incisions clean, dry and intact, no tenderness to palpation, no range of motion testing, cap refill 2+ [de-identified] : Xrays from 3/6/24 AP and lateral of the right knee reviewed showing a healing patella with hardware in appropriate alignment.  [de-identified] :  We had a thorough discussion in regards to diagnosis, prognosis and treatment modalities. The patient clinically is doing well and is walking without pain. He has no tenderness to palpation. Radiographs show the patella is healing well.   He is having issues with stiffness so we will recommend aggressive increasing his ROM,  RTC in 6 weeks [de-identified] : 33 year old male s/p Repair of right patella nonunion. DOS: 01/12/2023

## 2024-04-17 ENCOUNTER — APPOINTMENT (OUTPATIENT)
Dept: ORTHOPEDIC SURGERY | Facility: CLINIC | Age: 34
End: 2024-04-17
Payer: COMMERCIAL

## 2024-04-17 PROCEDURE — 73560 X-RAY EXAM OF KNEE 1 OR 2: CPT | Mod: RT

## 2024-04-17 PROCEDURE — 99214 OFFICE O/P EST MOD 30 MIN: CPT | Mod: 25

## 2024-04-17 NOTE — REASON FOR VISIT
[Follow-Up Visit] : a follow-up visit for [FreeTextEntry2] :  Repair of right patella nonunion. DOS: 01/12/2023.

## 2024-04-17 NOTE — HISTORY OF PRESENT ILLNESS
[de-identified] : 32 yo M presents for follow up on R patella fx s/p ORIF ~8 surgeries due to nonhealing patella fx. He reports overall he had been doing well.   He is now little over 3 months postop.  He is able to ambulate without assistive devices.  He still uses the range of motion brace when he is out of the house.  His biggest concern lack of knee flexion at this time.  He had been following with endocrinologist and was found to be Vit D deficient & has been taking Vitamin D.

## 2024-04-17 NOTE — PHYSICAL EXAM
[de-identified] : Physical Exam: General: Well appearing, no acute distress, A&O Neurologic: A&Ox3, No focal deficits Head: NCAT without abrasions, lacerations, or ecchymosis to head, face, or scalp Respiratory: Equal chest wall expansion bilaterally, no accessory muscle use Lymphatic: No lymphadenopathy palpated Skin: Warm and dry Psychiatric: Normal mood and affect  Right lower extremity: SILT s/s/sp/dp/t Fires EHL/FHL/GS/TA 2+ DP/PT pulse brisk capillary refill Knee range of motion from 0 to 45 degrees Well-healed surgical scars [de-identified] : 2 views of the right knee obtained today show a healing patella fracture stabilized by an anterior plate & multiple locking screws.

## 2024-04-17 NOTE — DISCUSSION/SUMMARY
[de-identified] : 33-year-old male with multiple recurrent patella nonunion status post multiple surgeries.  He is doing reasonably well at the present time but does have significant knee stiffness.  Due to this I would recommend he come out of the brace as much as possible.  It is understandable that he uses it for support when outside but he needs to work on aggressive range of motion.  Physical therapy was ordered as well as a knee flexion static progressive splint.  Give us a call in about 8 weeks to let us know how he is doing and if he is still having significant symptoms, we will likely refer him to the sports medicine service for discussion of contracture release.  The question of when to drive is impossible to generalize to everyone because it is largely dependent on the individual.  Importantly, doctors do not have a license with the DMV to "clear you" or "release you" to return to driving.  There are 3 primary criteria that must be met.  You need to be off of narcotic pain medicines (otherwise you are driving under the influence).  You need to be able to get in and out of the 's seat comfortably.  And you must have regained your normal reflexes / strength.  Also, return to driving depends partly on what side had surgery (ie. Right leg operates the pedals; people with Left side surgery can generally get back to driving much sooner unless you have a clutch).  The average time to return to driving is around 2 weeks after you return to normal walking for the right side and usually sooner for the left.  We recommend 'testing' yourself with another licensed  in an empty parking lot or quiet street first in order to check your reflexes moving your foot from pedal to pedal.  Filiberto Obrien MD Orthopaedic Trauma Surgeon [facility] Orthopaedic  Orthopaedic Trauma, [facility]

## 2024-06-12 ENCOUNTER — APPOINTMENT (OUTPATIENT)
Dept: ORTHOPEDIC SURGERY | Facility: CLINIC | Age: 34
End: 2024-06-12
Payer: COMMERCIAL

## 2024-06-12 VITALS
SYSTOLIC BLOOD PRESSURE: 144 MMHG | HEIGHT: 61 IN | BODY MASS INDEX: 25.86 KG/M2 | WEIGHT: 137 LBS | DIASTOLIC BLOOD PRESSURE: 88 MMHG | HEART RATE: 77 BPM

## 2024-06-12 DIAGNOSIS — M25.661 STIFFNESS OF RIGHT KNEE, NOT ELSEWHERE CLASSIFIED: ICD-10-CM

## 2024-06-12 DIAGNOSIS — S82.031K: ICD-10-CM

## 2024-06-12 PROCEDURE — 99214 OFFICE O/P EST MOD 30 MIN: CPT | Mod: 25

## 2024-06-12 PROCEDURE — 73560 X-RAY EXAM OF KNEE 1 OR 2: CPT | Mod: RT

## 2024-06-12 NOTE — REASON FOR VISIT
[Follow-Up Visit] : a follow-up visit for [FreeTextEntry2] : Repair of right patella nonunion fx. DOS: 01/12/2023

## 2024-06-12 NOTE — HISTORY OF PRESENT ILLNESS
5/30/2023    Jackie Gale  [address] [facility] [address] [address]        Dear Jackie Gale,    This is a reminder for you to schedule an appointment with the Department of Gastroenterology for the following:    Colonoscopy    Please call [phone] to schedule a date that is convenient for you. Please disregard this letter if you have already been scheduled      Sincerely,    Eugenie Gayle MD, Tereso Rodriguez MD, Marla Clifton MD, Greg Falk MD, Madie Tristan MD, and Leonidas Scott MD      Department of Gastroenterology    [address] [place] [address] [phone]       [de-identified] : 32 yo M presents for follow up on R patella fx s/p ORIF ~8 surgeries due to nonhealing patella fx. He reports overall he had been doing well.   He is now little over 5 months postop.  He is able to ambulate without assistive devices.  He states his knee ROM is 0-60 at PT and 0-45 on his own.  His biggest concern lack of knee flexion at this time.  He had been following with endocrinologist and was found to be Vit D deficient & has been taking Vitamin D.

## 2024-06-12 NOTE — DISCUSSION/SUMMARY
[de-identified] : 33-year-old male with multiple recurrent patella nonunion status post multiple surgeries.  He is doing reasonably well at the present time but does have significant knee stiffness.    Physical therapy was ordered as well as a knee flexion static progressive splint.  As he is still having significant symptoms, we will refer him to the sports medicine service for discussion of contracture release.  As he lives in [place], he may follow-up with the [place] office or here in [place].  Both contact information was given to him.  No orthopedic trauma intervention is required but we will help facilitate future care if necessary. The patient may follow up as needed.  Filiberto Obrien MD Orthopaedic Trauma Surgeon [facility] Orthopaedic  Orthopaedic Trauma, [facility]

## 2024-06-12 NOTE — PHYSICAL EXAM
[de-identified] : Physical Exam: General: Well appearing, no acute distress, A&O Neurologic: A&Ox3, No focal deficits Head: NCAT without abrasions, lacerations, or ecchymosis to head, face, or scalp Respiratory: Equal chest wall expansion bilaterally, no accessory muscle use Lymphatic: No lymphadenopathy palpated Skin: Warm and dry Psychiatric: Normal mood and affect  Right lower extremity: SILT s/s/sp/dp/t Fires EHL/FHL/GS/TA 2+ DP/PT pulse brisk capillary refill Knee range of motion from 0 to 45 degrees Well-healed surgical scars [de-identified] : 2 views of the right knee obtained today show a healing patella fracture stabilized by an anterior plate & multiple locking screws.

## 2024-06-17 ENCOUNTER — APPOINTMENT (OUTPATIENT)
Dept: ORTHOPEDIC SURGERY | Facility: CLINIC | Age: 34
End: 2024-06-17
Payer: COMMERCIAL

## 2024-06-17 VITALS — BODY MASS INDEX: 25.86 KG/M2 | WEIGHT: 137 LBS | HEIGHT: 61 IN

## 2024-06-17 DIAGNOSIS — M24.661 ANKYLOSIS, RIGHT KNEE: ICD-10-CM

## 2024-06-17 PROCEDURE — 99204 OFFICE O/P NEW MOD 45 MIN: CPT

## 2024-06-17 NOTE — HISTORY OF PRESENT ILLNESS
[de-identified] : DONNA KAPLAN is a 33 year male being seen for second opinion visit R knee pain. He is a referral from Dr. Obrien.  He reports he has had 6-7 surgeries on his patella starting when he was 14.  He suffered multiple fractures, nonunions and ORIF.  Most recently he suffered a patellar fracture in 2020 that lead to a non-union. He had a right patellar ORIF of the non-union on 7/16/2020. As per Dr. Obrien, the patient has seen multiple surgeons and had multiple surgeries. Dr. Obrien also noted that the patient had his implant removed several years ago, but suffered a new fall in 2024 that caused instability and inability to bear weight. He received a subsequent patellar ORIF by Dr. Obrien on 1/12/2024. Currently, he lacks full range of motion of his right knee. He reports he is experiencing significant stiffness after this most recent surgery.  He states he has had some stiffness in the past but not to this degree.  He reports his physical therapist has gotten his range of motion from 0 to 65 degrees.  He has been in PT for 4 months.  He states his range of motion was greater than 100 degrees of flexion after everyone of his previous surgeries.  Presents today to see if he is a candidate for a knee arthroscopy lysis of adhesions and manipulation under anesthesia.

## 2024-06-17 NOTE — ADDENDUM
[FreeTextEntry1] : Documented by Haley Smith acting as a scribe for Dr. Leavitt on 06/17/2024. All medical record entries made by the Scribe were at my, Dr. Leavitt's, direction and personally dictated by me on 06/17/2024. I have reviewed the chart and agree that the record accurately reflects my personal performance of the history, physical exam, procedure and imaging.

## 2024-06-17 NOTE — CONSULT LETTER
[Dear  ___] : Dear  [unfilled], [Consult Letter:] : I had the pleasure of evaluating your patient, [unfilled]. [Please see my note below.] : Please see my note below. [Sincerely,] : Sincerely, [FreeTextEntry3] : Dr. Darrell Leavitt

## 2024-06-17 NOTE — PHYSICAL EXAM
[de-identified] : Physical Exam:  General: Well appearing, no acute distress  Neurologic: A&Ox3, No focal deficits  Head: NCAT without abrasions, lacerations, or ecchymosis to head, face, or scalp  Eyes: No scleral icterus, no gross abnormalities  Respiratory: Equal chest wall expansion bilaterally, no accessory muscle use  Lymphatic: No lymphadenopathy palpated  Skin: Warm and dry  Psychiatric: Normal mood and affect  Right knee incisions are well-healed but adhesed.  There are no signs of infection. Patient has tenderness in the patella facets and moderate patella mobility with prominence of hardware from prior procedures.  He has range of motion from 2 to 45 degrees of active range of motion, with passive range of motion to zero degrees.  He can perform a straight leg raise with a slight lag.  There is significant weakness and atrophy in the quad compared to his contralateral side.  He is stable to valgus and varus stress.  His calf and thigh are soft and nontender.  He is grossly neurovascular intact distally. [de-identified] : Most recent x-rays performed at Dr. Obrien's office including 2 views of the right knee were reviewed.  These reveal an ORIF of the patella with significant hardware in place.

## 2024-06-17 NOTE — DISCUSSION/SUMMARY
[de-identified] : I had a lengthy discussion with the patient regarding their current condition. We discussed the treatment options including operative and nonoperative management. At this time I recommended the patient waits for arthroscopy until at least 9 months post-operation of subsequent patellar ORIF by Dr. Obrien on 1/12/2024.  Recommend patient to continue doing physical therapy for the mean time. All questions were answered and the patient verbalized understanding. The patient is in agreement with this treatment plan and will follow up in 4 months.

## 2024-07-02 ENCOUNTER — NON-APPOINTMENT (OUTPATIENT)
Age: 34
End: 2024-07-02

## 2024-07-23 NOTE — H&P PST ADULT - NS SC CAGE ALCOHOL ANNOYED YOU
How Severe Is Your Skin Lesion?: mild Has Your Skin Lesion Been Treated?: not been treated Is This A New Presentation, Or A Follow-Up?: Skin Lesion no Ketoconazole Pregnancy And Lactation Text: This medication is Pregnancy Category C and it isn't know if it is safe during pregnancy. It is also excreted in breast milk and breast feeding isn't recommended.

## 2024-10-21 ENCOUNTER — APPOINTMENT (OUTPATIENT)
Dept: ORTHOPEDIC SURGERY | Facility: CLINIC | Age: 34
End: 2024-10-21
Payer: COMMERCIAL

## 2024-10-21 DIAGNOSIS — M24.661 ANKYLOSIS, RIGHT KNEE: ICD-10-CM

## 2024-10-21 PROCEDURE — 99214 OFFICE O/P EST MOD 30 MIN: CPT

## 2024-11-18 ENCOUNTER — APPOINTMENT (OUTPATIENT)
Dept: ORTHOPEDIC SURGERY | Facility: CLINIC | Age: 34
End: 2024-11-18
Payer: COMMERCIAL

## 2024-11-18 DIAGNOSIS — S82.031K: ICD-10-CM

## 2024-11-18 DIAGNOSIS — M24.661 ANKYLOSIS, RIGHT KNEE: ICD-10-CM

## 2024-11-18 PROCEDURE — 99214 OFFICE O/P EST MOD 30 MIN: CPT

## 2025-08-19 ENCOUNTER — APPOINTMENT (OUTPATIENT)
Dept: HEMATOLOGY ONCOLOGY | Facility: CLINIC | Age: 35
End: 2025-08-19
Payer: COMMERCIAL

## 2025-08-19 ENCOUNTER — RESULT REVIEW (OUTPATIENT)
Age: 35
End: 2025-08-19

## 2025-08-19 VITALS
TEMPERATURE: 98.3 F | SYSTOLIC BLOOD PRESSURE: 129 MMHG | OXYGEN SATURATION: 100 % | HEART RATE: 70 BPM | DIASTOLIC BLOOD PRESSURE: 80 MMHG

## 2025-08-19 VITALS — WEIGHT: 136 LBS | BODY MASS INDEX: 25.7 KG/M2

## 2025-08-19 DIAGNOSIS — D75.89 OTHER SPECIFIED DISEASES OF BLOOD AND BLOOD-FORMING ORGANS: ICD-10-CM

## 2025-08-19 DIAGNOSIS — S82.031K: ICD-10-CM

## 2025-08-19 DIAGNOSIS — M25.561 PAIN IN RIGHT KNEE: ICD-10-CM

## 2025-08-19 DIAGNOSIS — E83.110 HEREDITARY HEMOCHROMATOSIS: ICD-10-CM

## 2025-08-19 DIAGNOSIS — R79.89 OTHER SPECIFIED ABNORMAL FINDINGS OF BLOOD CHEMISTRY: ICD-10-CM

## 2025-08-19 DIAGNOSIS — E83.19 OTHER DISORDERS OF IRON METABOLISM: ICD-10-CM

## 2025-08-19 PROCEDURE — 99204 OFFICE O/P NEW MOD 45 MIN: CPT

## 2025-08-20 ENCOUNTER — NON-APPOINTMENT (OUTPATIENT)
Age: 35
End: 2025-08-20

## 2025-08-20 PROBLEM — E83.19 IRON OVERLOAD: Status: ACTIVE | Noted: 2025-08-19

## 2025-08-20 PROBLEM — M25.561 KNEE PAIN, RIGHT: Status: RESOLVED | Noted: 2017-10-18 | Resolved: 2025-08-20

## 2025-08-20 PROBLEM — E83.110 HEMOCHROMATOSIS ASSOCIATED WITH MUTATION IN HFE GENE: Status: ACTIVE | Noted: 2025-08-19

## 2025-08-20 PROBLEM — S82.031K: Status: RESOLVED | Noted: 2017-10-18 | Resolved: 2025-08-20

## 2025-08-20 RX ORDER — FENOFIBRATE 160 MG/1
160 TABLET ORAL
Refills: 0 | Status: ACTIVE | COMMUNITY

## 2025-08-20 RX ORDER — ATORVASTATIN CALCIUM 10 MG/1
10 TABLET, FILM COATED ORAL
Refills: 0 | Status: ACTIVE | COMMUNITY

## 2025-08-20 RX ORDER — ERGOCALCIFEROL 1.25 MG/1
1.25 MG CAPSULE, LIQUID FILLED ORAL
Refills: 0 | Status: ACTIVE | COMMUNITY

## (undated) DEVICE — SUT VICRYL PLUS 0 27" CT-2 UNDYED

## (undated) DEVICE — DRAPE MAYO STAND 23"

## (undated) DEVICE — SOL IRR POUR NS 0.9% 1000ML

## (undated) DEVICE — SUT NYLON 3-0 18" PS-2

## (undated) DEVICE — SOL IRR POUR H2O 1000ML

## (undated) DEVICE — DRILL BIT SYNTHES ORTHO QCP GLD 2.5X110MM ST

## (undated) DEVICE — DRAPE C ARM UNIVERSAL

## (undated) DEVICE — Device

## (undated) DEVICE — VENODYNE/SCD SLEEVE CALF MEDIUM

## (undated) DEVICE — DRAPE IOBAN 23" X 17"

## (undated) DEVICE — DRAPE 1/2 SHEET 40X57"

## (undated) DEVICE — SUT MONOCRYL 2-0 27" SH UNDYED

## (undated) DEVICE — GLV 7.5 PROTEXIS (WHITE)

## (undated) DEVICE — DRAPE C ARM C-ARMOUR

## (undated) DEVICE — GLV 8 PROTEXIS (BLUE)

## (undated) DEVICE — PACK EXTREMITY

## (undated) DEVICE — NDL HYPO REGULAR BEVEL 25G X 1.5" (BLUE)

## (undated) DEVICE — DRAPE SPLIT SHEET 77" X 108"

## (undated) DEVICE — WARMING BLANKET UPPER ADULT